# Patient Record
Sex: MALE | Race: WHITE | NOT HISPANIC OR LATINO | Employment: UNEMPLOYED | URBAN - METROPOLITAN AREA
[De-identification: names, ages, dates, MRNs, and addresses within clinical notes are randomized per-mention and may not be internally consistent; named-entity substitution may affect disease eponyms.]

---

## 2018-12-06 ENCOUNTER — HOSPITAL ENCOUNTER (EMERGENCY)
Facility: HOSPITAL | Age: 28
Discharge: HOME | End: 2018-12-06
Attending: EMERGENCY MEDICINE

## 2018-12-06 VITALS
OXYGEN SATURATION: 97 % | BODY MASS INDEX: 20.73 KG/M2 | WEIGHT: 140 LBS | SYSTOLIC BLOOD PRESSURE: 141 MMHG | HEIGHT: 69 IN | HEART RATE: 117 BPM | DIASTOLIC BLOOD PRESSURE: 86 MMHG

## 2018-12-06 DIAGNOSIS — F15.10 METHAMPHETAMINE ABUSE: ICD-10-CM

## 2018-12-06 DIAGNOSIS — T40.1X1A HEROIN OVERDOSE, ACCIDENTAL OR UNINTENTIONAL, INITIAL ENCOUNTER: Primary | ICD-10-CM

## 2018-12-06 DIAGNOSIS — F19.10 IV DRUG ABUSE: ICD-10-CM

## 2018-12-06 LAB — POCT GLUCOSE: 111 MG/DL (ref 70–110)

## 2018-12-06 PROCEDURE — 63600175 PHARM REV CODE 636 W HCPCS: Performed by: EMERGENCY MEDICINE

## 2018-12-06 PROCEDURE — 82962 GLUCOSE BLOOD TEST: CPT

## 2018-12-06 PROCEDURE — 96374 THER/PROPH/DIAG INJ IV PUSH: CPT

## 2018-12-06 PROCEDURE — 99291 CRITICAL CARE FIRST HOUR: CPT | Mod: 25

## 2018-12-06 RX ORDER — NALOXONE HYDROCHLORIDE 4 MG/.1ML
SPRAY NASAL
Qty: 1 EACH | Refills: 11 | Status: SHIPPED | OUTPATIENT
Start: 2018-12-06 | End: 2019-10-15

## 2018-12-06 RX ORDER — NALOXONE HCL 0.4 MG/ML
2 VIAL (ML) INJECTION
Status: COMPLETED | OUTPATIENT
Start: 2018-12-06 | End: 2018-12-06

## 2018-12-06 RX ADMIN — NALOXONE HYDROCHLORIDE 2 MG: 0.4 INJECTION, SOLUTION INTRAMUSCULAR; INTRAVENOUS; SUBCUTANEOUS at 07:12

## 2018-12-07 NOTE — ED NOTES
Pt immediately responded to narcan 2mg.  Awake and alert less than two minutes later.  Admitted to taking heroin.

## 2018-12-07 NOTE — ED PROVIDER NOTES
"Encounter Date: 12/6/2018    SCRIBE #1 NOTE: I, Danyell Howell, am scribing for, and in the presence of, Dr. Yuri Carlton.       History   No chief complaint on file.      Time seen by provider: 6:58 PM on 12/06/2018    Darvin Forman is a 28 y.o. male who presents to the ED with an onset of overdose on heroin that presented immediately prior to arriving to the ED. A friends dropped him off at the ED. Pt has never been in rehab or detox before. He last overdosed at 12 years old. Pt has been doing drugs for "years". The patient denies any other symptoms at this time. No pertinent SHx noted. Pt is allergic to Demerol.      The history is provided by the patient.     Review of patient's allergies indicates:   Allergen Reactions    Demerol [meperidine] Rash     Past Medical History:   Diagnosis Date    Chronic shoulder pain      No past surgical history on file.  No family history on file.  Social History     Tobacco Use    Smoking status: Current Every Day Smoker     Packs/day: 1.50     Types: Cigarettes   Substance Use Topics    Alcohol use: Yes     Comment: socially    Drug use: Yes     Types: Marijuana     Comment: ocassionally     Review of Systems   Constitutional: Negative for fever.        Positive for overdose.   HENT: Negative for sore throat.    Respiratory: Negative for shortness of breath.    Cardiovascular: Negative for chest pain.   Gastrointestinal: Negative for nausea.   Genitourinary: Negative for dysuria.   Musculoskeletal: Negative for back pain.   Skin: Negative for rash.   Neurological: Negative for weakness.   Hematological: Does not bruise/bleed easily.       Physical Exam     Initial Vitals   BP Pulse Resp Temp SpO2   -- -- -- -- --      MAP       --         Physical Exam    Nursing note and vitals reviewed.  Constitutional: He appears well-developed and well-nourished. He is diaphoretic. No distress.   Left EJ 18 gauge.   HENT:   Head: Normocephalic and atraumatic.   Eyes: Conjunctivae " and EOM are normal. Pupils are equal, round, and reactive to light.   Pinpoint pupils.    Neck: Neck supple.   Cardiovascular: Regular rhythm and normal heart sounds. Tachycardia present.  Exam reveals no gallop and no friction rub.    No murmur heard.  Pulmonary/Chest: Breath sounds normal. Apnea noted. No respiratory distress. He has no wheezes. He has no rhonchi. He has no rales.   Abdominal: Soft. Bowel sounds are normal. He exhibits no distension. There is no tenderness.   Musculoskeletal: Normal range of motion. He exhibits no edema or tenderness.   Neurological: He is alert and oriented to person, place, and time. GCS eye subscore is 1. GCS verbal subscore is 1. GCS motor subscore is 1.   Skin: Skin is warm.   Cyanotic.   Psychiatric: He has a normal mood and affect.         ED Course   Procedures  Labs Reviewed - No data to display       Imaging Results    None          Medical Decision Making:   History:   Old Medical Records: I decided to obtain old medical records.  Clinical Tests:   Lab Tests: Ordered and Reviewed  Patient received Narcan and admitted to heroin overdose.  He was trading someone methamphetamines for heroin and the lady made a premixed syringe for him with the intention that he would only use half.  However, Darvin decided to use a whole thing.  He therefore overdose.  He was brought in by private vehicle dropped off by his friends who abruptly left.  The patient was placed immediately on a gurney where I placed a 18 gauge and we gave him 1 mg of Narcan x2 with tactile firm nipple stimulation any he quickly aroused.  The patient never gave me a sample of urine, but has been are awake alert and oriented for 2 and 0.5 hr now.  I feel that he is stable for discharge at this time and I counseled him on the use of drugs and his overdose tonight encouraged him to seek outpatient therapy.  The patient does not want detox and does not want to be admitted.            Scribe Attestation:   Scribe #1:  I performed the above scribed service and the documentation accurately describes the services I performed. I attest to the accuracy of the note.    Attending Attestation:         Attending Critical Care:   Critical Care Times:   Direct Patient Care (initial evaluation, reassessments, and time considering the case)................................................................10 minutes.   Additional History from reviewing old medical records or taking additional history from the family, EMS, PCP, etc.......................5 minutes.   Ordering, Reviewing, and Interpreting Diagnostic Studies...............................................................................................................5 minutes.   Documentation..................................................................................................................................................................................5 minutes.   Consultation with other Physicians. .................................................................................................................................................5 minutes.   Consultation with the patient's family directly relating to the patient's condition, care, and DNR status (when patient unable)......10 minutes.   Other..................................................................................................................................................................................................5 minutes.   ==============================================================  · Total Critical Care Time - exclusive of procedural time: 45 minutes.  ==============================================================  Critical care was necessary to treat or prevent imminent or life-threatening deterioration of the following conditions: overdose.       I, Dr. Yuri Carlton personally performed the services described in this documentation. All medical record entries made by the  scribe were at my direction and in my presence.  I have reviewed the chart and agree that the record reflects my personal performance and is accurate and complete. Yuri Carlton MD.  9:35 PM 12/06/2018    DISCLAIMER: This note was prepared with Dragon NaturallySpeaking voice recognition transcription software. Garbled syntax, mangled pronouns, and other bizarre constructions may be attributed to that software system            Clinical Impression:   The primary encounter diagnosis was Heroin overdose, accidental or unintentional, initial encounter. Diagnoses of Methamphetamine abuse and IV drug abuse were also pertinent to this visit.      Disposition:   Disposition: Discharged  Condition: Stable                        Yuri Carlton MD  12/06/18 8448

## 2019-02-11 ENCOUNTER — HOSPITAL ENCOUNTER (EMERGENCY)
Facility: HOSPITAL | Age: 29
Discharge: LEFT BEFORE DISCHARGE | End: 2019-02-11
Attending: EMERGENCY MEDICINE
Payer: MEDICAID

## 2019-02-11 VITALS — DIASTOLIC BLOOD PRESSURE: 94 MMHG | SYSTOLIC BLOOD PRESSURE: 137 MMHG | OXYGEN SATURATION: 99 % | HEART RATE: 115 BPM

## 2019-02-11 DIAGNOSIS — T40.1X1A HEROIN OVERDOSE, ACCIDENTAL OR UNINTENTIONAL, INITIAL ENCOUNTER: Primary | ICD-10-CM

## 2019-02-11 PROCEDURE — 99282 EMERGENCY DEPT VISIT SF MDM: CPT

## 2019-02-11 NOTE — LETTER
Patient: Darvin Forman  YOB: 1990  Date: 2/11/2019 Time: 10:15 PM  Location: [facility]    Leaving the [facility] Against Medical Advice    Chart #:[id number]    This will certify that I, the undersigned,    ______________________________________________________________________    A patient in the above named medical center, having requested discharge and removal from the medical [place] against the advice of my attending physician(s), hereby release [facility], its physicians, officers and employees, severally and individually, from any and all liability of any nature whatsoever for any injury or harm or complication of any kind that may result directly or indirectly, by reason of my terminating my stay as a patient at [facility] and my departure from [facility], and hereby waive any and all rights of action I may now have or later acquire as a result of my voluntary departure from [facility] and the termination of my stay as a patient therein.    This release is made with the full knowledge of the danger that may result from the action which I am taking.      Date:_______________________                         ___________________________                                                                                    Patient/Legal Representative    Witness:        ____________________________                          ___________________________  Nurse                                                                        Physician

## 2019-02-11 NOTE — LETTER
Patient: Darvin Forman  YOB: 1990  Date: 2/11/2019 Time: 10:14 PM  Location: [facility]    Leaving the [facility] Against Medical Advice    Chart #:[id number]    This will certify that I, the undersigned,    ______________________________________________________________________    A patient in the above named medical center, having requested discharge and removal from the medical [place] against the advice of my attending physician(s), hereby release [facility], its physicians, officers and employees, severally and individually, from any and all liability of any nature whatsoever for any injury or harm or complication of any kind that may result directly or indirectly, by reason of my terminating my stay as a patient at [facility] and my departure from [facility], and hereby waive any and all rights of action I may now have or later acquire as a result of my voluntary departure from [facility] and the termination of my stay as a patient therein.    This release is made with the full knowledge of the danger that may result from the action which I am taking.      Date:_______________________                         ___________________________                                                                                    Patient/Legal Representative    Witness:        ____________________________                          ___________________________  Nurse                                                                        Physician

## 2019-02-12 NOTE — ED NOTES
Assumed care:  Patient awake, alert and oriented x 3, skin warm and dry, in NAD.    Patient identifiers for Darvin ASH Speed checked and correct.  LOC:  Patient is awake, alert, and aware of environment with an appropriate affect. Patient is oriented x 3 and speaking appropriately.  APPEARANCE:  Patient resting comfortably and in no acute distress. Patient is clean and well groomed, patient's clothing is properly fastened.  SKIN:  The skin is warm and dry. Patient has normal skin turgor and moist mucus membranes. Skin is intact; no bruising or breakdown noted.  MUSCULOSKELETAL:  Patient is moving all extremities well, no obvious deformities noted. Pulses intact.   RESPIRATORY:  Airway is open and patent. Respirations are spontaneous and non-labored with normal effort and rate.  CARDIAC:  Patient has a normal rate and rhythm. No peripheral edema noted. Capillary refill < 3 seconds.  ABDOMEN:  No distention noted.  Soft and non-tender upon palpation.  NEUROLOGICAL:  PERRL. Facial expression is symmetrical. Hand grasps are equal bilaterally. Normal sensation in all extremities when touched with finger.  Allergies reported:    Review of patient's allergies indicates:   Allergen Reactions    Demerol [meperidine] Rash     OTHER NOTES:  Patient arrived via EMS for heroin overdose.  Patient received narcan in route.  Patient is now AAO

## 2019-02-12 NOTE — ED TRIAGE NOTES
Found unresponsive at gas station in car and doing CPR in car; apneic, lying in back seat and became responsive after 2nd narcan

## 2019-02-12 NOTE — ED PROVIDER NOTES
"Encounter Date: 2/11/2019    SCRIBE #1 NOTE: I, Drea Flores , am scribing for, and in the presence of,  Dr. Garcia . I have scribed the entire note.       History     Chief Complaint   Patient presents with    Drug Overdose     on heroin; found apnic and unresponsive      02/11/2019  9:21 PM       The patient is a 28 y.o. male who is presenting per EMS s/p unintentional heroin OD that occurred just PTA. Per EMS, the pt was apneic and unresponsive at arrival to scene with cyanosis noted. Pt was given 1 mg of Narcan IV and 1 mg Narcan IV with subsequent resolution in sxs and return to consciousness. Pt denies any current physical complaints or SI. He endorses long hx of IV drug abuse and reports he simply "took too much". Pertinent PMHx includes IV DA and chronic shoulder pain. No pertinent past  surgical hx.             The history is provided by the patient, medical records and the EMS personnel.     Review of patient's allergies indicates:   Allergen Reactions    Demerol [meperidine] Rash     Past Medical History:   Diagnosis Date    Chronic shoulder pain      History reviewed. No pertinent surgical history.  History reviewed. No pertinent family history.  Social History     Tobacco Use    Smoking status: Current Every Day Smoker     Packs/day: 1.50     Types: Cigarettes    Smokeless tobacco: Never Used   Substance Use Topics    Alcohol use: Yes     Comment: socially    Drug use: Yes     Types: Marijuana     Comment: ocassionally     Review of Systems   Constitutional: Negative for fever.   HENT: Negative for sore throat.    Eyes: Negative for visual disturbance.   Respiratory: Negative for shortness of breath.    Cardiovascular: Negative for chest pain.   Gastrointestinal: Negative for nausea.   Genitourinary: Negative for dysuria.   Musculoskeletal: Negative for back pain.   Skin: Negative for rash.   Neurological: Negative for weakness.   Hematological: Does not bruise/bleed easily. "   Psychiatric/Behavioral: Negative for confusion.       Physical Exam     Initial Vitals [02/11/19 2119]   BP Pulse Resp Temp SpO2   (!) 148/103 (!) 124 -- -- 98 %      MAP       --         Physical Exam    Nursing note and vitals reviewed.  Constitutional: He appears well-developed and well-nourished. He is not diaphoretic. No distress.   HENT:   Head: Normocephalic and atraumatic.   Mouth/Throat: Oropharynx is clear and moist.   Eyes: Conjunctivae are normal. Pupils are equal, round, and reactive to light.   Pupils equal round and reactive to light with 2-4 mm in diameter.    Neck: Neck supple.   Cardiovascular: Regular rhythm, normal heart sounds and intact distal pulses. Exam reveals no gallop and no friction rub.    No murmur heard.  Tachycardic    Pulmonary/Chest: Breath sounds normal. He has no wheezes. He has no rhonchi. He has no rales.   Abdominal: Soft. He exhibits no distension. There is no tenderness.   Musculoskeletal: Normal range of motion.   Neurological: He is alert and oriented to person, place, and time. He has normal strength.   CN III through XII intact. Normal strength and sensation to all extremities.    Skin: No rash noted. No erythema.   Track mars noted to L AC.          ED Course   Procedures  Labs Reviewed - No data to display       Imaging Results    None                     Scribe Attestation:   Scribe #1: I performed the above scribed service and the documentation accurately describes the services I performed. I attest to the accuracy of the note.    I, Dr. Korey Garcia, personally performed the services described in this documentation. All medical record entries made by the scribe were at my direction and in my presence.  I have reviewed the chart and agree that the record reflects my personal performance and is accurate and complete. Korey Garcia MD.  10:31 PM 02/11/2019    Darvin Forman is a 28 y.o. male presenting with heroin overdose now asymptomatic following  Narcan.  Patient was likely hypoxic but has no obvious sequelae of brain injury. GCS 15 with nonfocal neurological examination. He is lucid and ultimately wishes to leave prior to recommended observation time in case of relapse of sedation. I do not think other diagnostic testing is indicated at this point.  I doubt suicidal intent.  No sign of other toxidrome with tachycardia likely related to withdrawal following Narcan.      Advised patient that they need further observation.  Patient refuses.  Patient is alert and oriented to person, place, and situation.  I explained the risks of worsening condition possibly leading to death in layman's terms to the patient.  Patient voices these risks back to me.   Patient is not altered and is not under the influence.  Patient is welcome to return to the hospital at any time if he chooses to do so.  I will discharge the patient AGAINST MEDICAL ADVICE.  Follow up closely with PCP and have family or friends observe closely at home.  Return precautions reviewed.        Clinical Impression:   The encounter diagnosis was Heroin overdose, accidental or unintentional, initial encounter.                             Korey Garcia MD  02/11/19 0760

## 2019-02-23 ENCOUNTER — HOSPITAL ENCOUNTER (EMERGENCY)
Facility: HOSPITAL | Age: 29
Discharge: HOME | End: 2019-02-23
Attending: EMERGENCY MEDICINE
Payer: MEDICAID

## 2019-02-23 VITALS
DIASTOLIC BLOOD PRESSURE: 87 MMHG | OXYGEN SATURATION: 95 % | WEIGHT: 155 LBS | HEART RATE: 108 BPM | HEIGHT: 69 IN | RESPIRATION RATE: 20 BRPM | TEMPERATURE: 99 F | BODY MASS INDEX: 22.96 KG/M2 | SYSTOLIC BLOOD PRESSURE: 130 MMHG

## 2019-02-23 DIAGNOSIS — F19.10 CHRONIC DRUG ABUSE: Primary | ICD-10-CM

## 2019-02-23 DIAGNOSIS — F19.10 INTRAVENOUS DRUG ABUSE: ICD-10-CM

## 2019-02-23 PROCEDURE — 99283 EMERGENCY DEPT VISIT LOW MDM: CPT

## 2019-02-23 RX ORDER — BUPRENORPHINE HYDROCHLORIDE AND NALOXONE HYDROCHLORIDE DIHYDRATE 2; .5 MG/1; MG/1
TABLET SUBLINGUAL EVERY 6 HOURS PRN
COMMUNITY
End: 2019-10-15

## 2019-02-23 RX ORDER — GABAPENTIN 300 MG/1
300 CAPSULE ORAL 3 TIMES DAILY
COMMUNITY
End: 2019-10-15

## 2019-02-23 RX ORDER — ESCITALOPRAM OXALATE 10 MG/1
10 TABLET ORAL DAILY
COMMUNITY
End: 2019-10-15

## 2019-02-23 NOTE — ED NOTES
MD in to speak to pt aware that he needs to put himself into rehab. Given written and verbal DC instructions questions answered per MD aware to follow up with PCP encouraged to return if needed.

## 2019-02-23 NOTE — ED PROVIDER NOTES
"Encounter Date: 2/23/2019    SCRIBE #1 NOTE: IEarline, am scribing for, and in the presence of, Dr. Yuri Carlton MD.       History     Chief Complaint   Patient presents with    Evaluation     under OPC for drug abuse       Time seen by provider: 2:58 PM on 02/23/2019    Darvin Forman is a 28 y.o. male with PMHx of drug use who presents to the ED for evaluation of drug use and possible SI. Patient is brought to the ED via  secondary to an OPC filed by the father. Patient adamantly denies SI. Per father, the patient has overdosed 3 times within this month and is partaking in drug use still via "shooting up Suboxone after crushing it". Father reports "wanting him to be admitted to a rehab facility". Patient currently denies SI and HI. He admits to using Suboxone and meth recently. He denies heroin use. Patient reports visiting [facility]. He denies hallucinations. Patient does not have psychiatric history. He was recently started on Lexapro. Patient has no other medical concerns or complaints at this moment.       The history is provided by the patient.     Review of patient's allergies indicates:   Allergen Reactions    Demerol [meperidine] Rash     Past Medical History:   Diagnosis Date    Chronic shoulder pain     Drug use      History reviewed. No pertinent surgical history.  History reviewed. No pertinent family history.  Social History     Tobacco Use    Smoking status: Current Every Day Smoker     Packs/day: 1.50     Types: Cigarettes    Smokeless tobacco: Never Used   Substance Use Topics    Alcohol use: Yes     Comment: socially    Drug use: Yes     Types: Marijuana     Comment: and heroin      Review of Systems   Constitutional: Negative for fever.   HENT: Negative for congestion.    Eyes: Negative for visual disturbance.   Respiratory: Negative for cough.    Cardiovascular: Negative for chest pain.   Gastrointestinal: Negative for nausea.   Musculoskeletal: Negative for back pain " and myalgias.   Skin: Negative for rash.   Neurological: Negative for headaches.   Hematological: Does not bruise/bleed easily.   Psychiatric/Behavioral: Negative for confusion, hallucinations, self-injury and suicidal ideas. The patient is not nervous/anxious.        Physical Exam     Initial Vitals [02/23/19 1441]   BP Pulse Resp Temp SpO2   130/87 108 20 98.7 °F (37.1 °C) 95 %      MAP       --         Physical Exam    Nursing note and vitals reviewed.  Constitutional: He appears well-developed and well-nourished. He is not diaphoretic. No distress.   HENT:   Head: Normocephalic and atraumatic.   Eyes: EOM are normal. Pupils are equal, round, and reactive to light.   Neck: Neck supple.   Cardiovascular: Normal rate, regular rhythm and normal heart sounds. Exam reveals no gallop and no friction rub.    No murmur heard.  Pulmonary/Chest: Breath sounds normal. No respiratory distress. He has no wheezes. He has no rhonchi. He has no rales.   Abdominal: Soft. Bowel sounds are normal. He exhibits no distension. There is no tenderness.   Musculoskeletal: Normal range of motion. He exhibits no edema or tenderness.   Neurological: He is alert and oriented to person, place, and time.   Skin: Skin is warm and dry.   Psychiatric: His speech is normal. Thought content normal. His mood appears not anxious. He is not actively hallucinating. Thought content is not paranoid and not delusional. Cognition and memory are normal. He does not exhibit a depressed mood. He expresses no homicidal and no suicidal ideation. He expresses no suicidal plans and no homicidal plans.   Negative SI and HI. Patient is calm and cooperative. He does not appear to be anxious.          ED Course   Procedures  Labs Reviewed - No data to display       Imaging Results    None          Medical Decision Making:   History:   Old Medical Records: I decided to obtain old medical records.  Patient is a longstanding intravenous drug abuser.  He is not suicidal  nor is he homicidal.  His father's concern because he has overdosed recently.  He was just kicked out of his apartment, called his dad, his dad does not want to pick him up and therefore the dad called for an OPC stating his son his drug problem.  The patient admits to using intravenous drugs but states he does not want to kill himself.  He does this recreationally.  He already follows up with as an outpatient at a  here in [place].  I see no reason to placed under physician's emergency certificate.  He appears to have a medical decision-making capacity to understand his medical illness and he does not appear to be significantly withdrawn severely depressed gravely disabled or suicidal or danger to others at this time.            Scribe Attestation:   Scribe #1: I performed the above scribed service and the documentation accurately describes the services I performed. I attest to the accuracy of the note.      I, Dr. Yuri Carlton personally performed the services described in this documentation. All medical record entries made by the scribe were at my direction and in my presence.  I have reviewed the chart and agree that the record reflects my personal performance and is accurate and complete. Yuri Carlton MD.  3:50 PM 02/23/2019    DISCLAIMER: This note was prepared with Dragon NaturallySpeaking voice recognition transcription software. Garbled syntax, mangled pronouns, and other bizarre constructions may be attributed to that software system              Clinical Impression:       ICD-10-CM ICD-9-CM   1. Chronic drug abuse F19.10 304.90   2. Intravenous drug abuse F19.10 305.90         Disposition:   Disposition: Discharged  Condition: Stable                        Yuri Carlton MD  02/23/19 0571

## 2019-02-23 NOTE — ED NOTES
Father states to MD that pt has overdosed x3 in the past month on heroin and is crushing his suboxone and snorting it father states he is concerned for son and wants him placed in drug rehab. Pt currently sleeping calm and cooperative

## 2019-10-15 ENCOUNTER — HOSPITAL ENCOUNTER (EMERGENCY)
Facility: HOSPITAL | Age: 29
Discharge: HOME | End: 2019-10-15
Attending: EMERGENCY MEDICINE
Payer: MEDICAID

## 2019-10-15 VITALS
HEART RATE: 115 BPM | RESPIRATION RATE: 16 BRPM | HEIGHT: 69 IN | BODY MASS INDEX: 21.74 KG/M2 | TEMPERATURE: 98 F | WEIGHT: 146.81 LBS | SYSTOLIC BLOOD PRESSURE: 135 MMHG | OXYGEN SATURATION: 100 % | DIASTOLIC BLOOD PRESSURE: 96 MMHG

## 2019-10-15 DIAGNOSIS — L02.416 ABSCESS OF LEFT LEG: Primary | ICD-10-CM

## 2019-10-15 PROCEDURE — 99283 EMERGENCY DEPT VISIT LOW MDM: CPT

## 2019-10-15 RX ORDER — CLINDAMYCIN HYDROCHLORIDE 150 MG/1
300 CAPSULE ORAL 4 TIMES DAILY
Qty: 40 CAPSULE | Refills: 0 | Status: SHIPPED | OUTPATIENT
Start: 2019-10-15 | End: 2019-10-20

## 2019-10-15 NOTE — ED NOTES
At D/C, Darvin Forman is AA & O x 3, his skin is warm and dry, follow up care discussed at length with patient/family to include meds and follow-up with MD; patient/family given discharge instructions along with prescriptions, as indicated, and care sheets.

## 2019-10-15 NOTE — ED PROVIDER NOTES
Encounter Date: 10/15/2019       History     Chief Complaint   Patient presents with    Abscess     HPI   Patient is a 29-year-old man who presents emergency department constant, worsening nonradiating erythema and pain to his left upper leg.  Symptoms are consistent with previous episodes of abscess/cellulitis.  He denies any fever.  He has not been on antibiotic therapy recently.  Review of patient's allergies indicates:   Allergen Reactions    Demerol [meperidine] Rash     Past Medical History:   Diagnosis Date    Chronic shoulder pain     Drug use      History reviewed. No pertinent surgical history.  History reviewed. No pertinent family history.  Social History     Tobacco Use    Smoking status: Current Every Day Smoker     Packs/day: 1.50     Types: Cigarettes    Smokeless tobacco: Never Used   Substance Use Topics    Alcohol use: Yes     Comment: socially    Drug use: Yes     Types: Marijuana     Review of Systems  REVIEW OF SYSTEMS  CONSTITUTIONAL: Negative for fever.  HEENT:  Negative for sore throat.   HEART:   Negative for chest pain..  LUNG:  Negative for shortness of breath.  ABDOMEN:  Negative for nausea.   :  No discharge, dysuria  EXTREMITIES:  No swelling  NEURO:  Negative for weakness.   SKIN:  Positive For rash  Psych: No depression  HEME: Does not bruise/bleed easily.           Physical Exam     Initial Vitals [10/15/19 0554]   BP Pulse Resp Temp SpO2   (!) 135/96 (!) 115 16 98.4 °F (36.9 °C) 100 %      MAP       --         Physical Exam  Physical Exam   Nursing note and vitals reviewed.   Constitutional: Oriented to person, place, and time. Appears well-developed and well-nourished.   HENT:   Head: Normocephalic and atraumatic.   Eyes: EOM are normal.   Neck: Normal range of motion. Neck supple.   Cardiovascular: Normal rate.   Pulmonary/Chest: Effort normal. Clear BS b/l   Abdominal: Soft, Non tender, no distension.   Musculoskeletal: Normal range of motion.   Neurological:Alert and  oriented to person, place, and time.   Skin:  There is a 2 x 2 cm area of erythema with mild induration and no fluctuance over the anterior aspect of his mid left thigh the area is mildly warm and mildly tender to palpation.  Psychiatric: Normal mood and affect. Behavior is normal.         ED Course   Procedures  Labs Reviewed - No data to display       Imaging Results    None          Medical Decision Making:   Initial Assessment:   This is an emergent evaluation of a patient with complaints of a skin infection.     I decided to obtain and review the old medical record.    Clinically the patient has an cellulitis versus early abscess.  I do not think this requires incision and drainage this time and patient would prefer outpatient antibiotic therapy.  The patient will be placed on antibiotics.  The patient has no signs of systemic symptoms or significant cellulitis to warrant admission at this time.  The patient has been instructed to follow up with their regular doctor or the emergency department in 3 days as needed if not improving.  I've given the patient specific return precautions.    Wound care has been discussed.      The results and physical exam findings were reviewed with the patient. Pt agrees with assessment, disposition and treatment plan and has no further questions or complaints at this time.    Jarred Sánchez M.D. 6:56 AM 10/15/2019                            Clinical Impression:       ICD-10-CM ICD-9-CM   1. Abscess of left leg L02.416 682.6                                Jarred Sánchez MD  10/15/19 0657

## 2019-10-15 NOTE — ED NOTES
Patient here with abscess to left upper leg; states it started yesterday and got worse; small area hot, red and tender; see picture.

## 2020-06-12 ENCOUNTER — HOSPITAL ENCOUNTER (EMERGENCY)
Facility: HOSPITAL | Age: 30
Discharge: HOME | End: 2020-06-12
Attending: EMERGENCY MEDICINE
Payer: MEDICAID

## 2020-06-12 VITALS
OXYGEN SATURATION: 97 % | SYSTOLIC BLOOD PRESSURE: 129 MMHG | TEMPERATURE: 99 F | HEART RATE: 108 BPM | DIASTOLIC BLOOD PRESSURE: 76 MMHG | BODY MASS INDEX: 22.96 KG/M2 | RESPIRATION RATE: 14 BRPM | WEIGHT: 155 LBS | HEIGHT: 69 IN

## 2020-06-12 DIAGNOSIS — R00.0 TACHYCARDIA: ICD-10-CM

## 2020-06-12 DIAGNOSIS — T40.1X1A HEROIN OVERDOSE, ACCIDENTAL OR UNINTENTIONAL, INITIAL ENCOUNTER: Primary | ICD-10-CM

## 2020-06-12 DIAGNOSIS — F19.20: ICD-10-CM

## 2020-06-12 LAB
ANION GAP SERPL CALC-SCNC: 13 MMOL/L (ref 8–16)
APAP SERPL-MCNC: <3 UG/ML (ref 10–20)
BASOPHILS # BLD AUTO: 0.03 K/UL (ref 0–0.2)
BASOPHILS NFR BLD: 0.4 % (ref 0–1.9)
BUN SERPL-MCNC: 13 MG/DL (ref 6–20)
CALCIUM SERPL-MCNC: 9.4 MG/DL (ref 8.7–10.5)
CHLORIDE SERPL-SCNC: 102 MMOL/L (ref 95–110)
CO2 SERPL-SCNC: 24 MMOL/L (ref 23–29)
CREAT SERPL-MCNC: 1.4 MG/DL (ref 0.5–1.4)
DIFFERENTIAL METHOD: NORMAL
EOSINOPHIL # BLD AUTO: 0 K/UL (ref 0–0.5)
EOSINOPHIL NFR BLD: 0.5 % (ref 0–8)
ERYTHROCYTE [DISTWIDTH] IN BLOOD BY AUTOMATED COUNT: 12.9 % (ref 11.5–14.5)
EST. GFR  (AFRICAN AMERICAN): >60 ML/MIN/1.73 M^2
EST. GFR  (NON AFRICAN AMERICAN): >60 ML/MIN/1.73 M^2
GLUCOSE SERPL-MCNC: 161 MG/DL (ref 70–110)
HCT VFR BLD AUTO: 43.5 % (ref 40–54)
HGB BLD-MCNC: 14.1 G/DL (ref 14–18)
IMM GRANULOCYTES # BLD AUTO: 0.02 K/UL (ref 0–0.04)
IMM GRANULOCYTES NFR BLD AUTO: 0.2 % (ref 0–0.5)
LYMPHOCYTES # BLD AUTO: 1.6 K/UL (ref 1–4.8)
LYMPHOCYTES NFR BLD: 20.4 % (ref 18–48)
MCH RBC QN AUTO: 29.1 PG (ref 27–31)
MCHC RBC AUTO-ENTMCNC: 32.4 G/DL (ref 32–36)
MCV RBC AUTO: 90 FL (ref 82–98)
MONOCYTES # BLD AUTO: 0.7 K/UL (ref 0.3–1)
MONOCYTES NFR BLD: 8.2 % (ref 4–15)
NEUTROPHILS # BLD AUTO: 5.6 K/UL (ref 1.8–7.7)
NEUTROPHILS NFR BLD: 70.3 % (ref 38–73)
NRBC BLD-RTO: 0 /100 WBC
PLATELET # BLD AUTO: 221 K/UL (ref 150–350)
PMV BLD AUTO: 11.2 FL (ref 9.2–12.9)
POTASSIUM SERPL-SCNC: 4.2 MMOL/L (ref 3.5–5.1)
RBC # BLD AUTO: 4.84 M/UL (ref 4.6–6.2)
SALICYLATES SERPL-MCNC: <5 MG/DL (ref 15–30)
SODIUM SERPL-SCNC: 139 MMOL/L (ref 136–145)
WBC # BLD AUTO: 8.03 K/UL (ref 3.9–12.7)

## 2020-06-12 PROCEDURE — 85025 COMPLETE CBC W/AUTO DIFF WBC: CPT

## 2020-06-12 PROCEDURE — 99284 EMERGENCY DEPT VISIT MOD MDM: CPT | Mod: 25

## 2020-06-12 PROCEDURE — 80329 ANALGESICS NON-OPIOID 1 OR 2: CPT

## 2020-06-12 PROCEDURE — 36415 COLL VENOUS BLD VENIPUNCTURE: CPT

## 2020-06-12 PROCEDURE — 25000003 PHARM REV CODE 250: Performed by: EMERGENCY MEDICINE

## 2020-06-12 PROCEDURE — 80307 DRUG TEST PRSMV CHEM ANLYZR: CPT

## 2020-06-12 PROCEDURE — 93005 ELECTROCARDIOGRAM TRACING: CPT

## 2020-06-12 PROCEDURE — 80048 BASIC METABOLIC PNL TOTAL CA: CPT

## 2020-06-12 RX ORDER — NALOXONE HYDROCHLORIDE 4 MG/.1ML
SPRAY NASAL
Qty: 1 EACH | Refills: 0 | Status: SHIPPED | OUTPATIENT
Start: 2020-06-12

## 2020-06-12 RX ADMIN — SODIUM CHLORIDE 1000 ML: 0.9 INJECTION, SOLUTION INTRAVENOUS at 10:06

## 2020-06-12 NOTE — DISCHARGE INSTRUCTIONS
If you do not stop abusing drugs, you may die from overdose or suffer other irreversible worsening of your health.    [facility]    [facility] [phone]    - Dr. Molina  - Dr. Barreto  - Dr. Almendarez  - Dr. Richardson  - Dr. Sierra  - Dr. Soto  - Dr. Cameron  - Dr. Love ([place])    [facility] [phone]    - Dr. Reddy  - Dr. Fonseca  - Dr. Guzman  - Dr. Rios    [facility]    - Dr. Triplett [phone]  - Dr. Ojeda [phone]  - Dr. Workman [phone]    Dr. Schreiber [phone]  Dr. Felix [phone]  Dr. Christine [phone]  Dr. Triplett [phone]  Dr. Hector [phone]  Dr. Holley [phone]  Dr. Anaya [phone]  Dr. Galicia [phone]  Dr. Cullen [phone]    [facility]    [facility] [phone]  Dr. Aguilar [phone]  Dr. Lucio [phone]  Dr. Jaeger [phone]  Dr. Colvin [phone]  Dr. Burleson [phone]  Dr. Almazan [phone]          [facility]  - [address] [place] [address]  - [phone]    [facility]  - [address] [place] [address]  - [phone]    [facility]  - [address], [address], [place]  - [phone]    [facility]  - [address] [place] [address]  - [phone]    [facility]  - [address], [facility] [address]  - [phone]    [facility]  - [address] [place] [address], [place] [address]  - [phone]    [facility]  - [address] [place] [address]  - [phone]

## 2020-06-12 NOTE — ED PROVIDER NOTES
"Encounter Date: 6/12/2020    SCRIBE #1 NOTE: I, Estefany Bailey, am scribing for, and in the presence of, Koery Garcia MD.       History     Chief Complaint   Patient presents with    Drug Overdose     found unresponsive, "woke up" upon EMS moving onto stretcher.  Reports Heroin injection this am and crystal meth last night.  Current pinpoint pupils, GCS 15        Time seen by provider: 10:12 AM on 06/12/2020    Darvin Forman is a 30 y.o. male with a PMHx of drug use who presents to the ED via EMS with an onset of drug overdose. Per EMS, the patient was found unresponsive, but became responsive once they lifted him onto the stretcher. The patient did not require narcan. The patient admits to heroin use 1 hour prior to EMS arrival, and meth use last night. The patient denies alcohol use. The patient denies trying to hurt himself, pain or any other symptoms at this time. No PSHx.      The history is provided by the patient and the EMS personnel.     Review of patient's allergies indicates:   Allergen Reactions    Demerol [meperidine] Rash     Past Medical History:   Diagnosis Date    Chronic shoulder pain     Drug use      No past surgical history on file.  No family history on file.  Social History     Tobacco Use    Smoking status: Current Every Day Smoker     Packs/day: 1.50     Types: Cigarettes    Smokeless tobacco: Never Used   Substance Use Topics    Alcohol use: Yes     Comment: socially    Drug use: Yes     Types: Marijuana     Review of Systems   Constitutional: Negative for fever.        + Drug overdose.   HENT: Negative for sore throat.    Respiratory: Negative for shortness of breath.    Cardiovascular: Negative for chest pain.   Gastrointestinal: Negative for nausea.   Genitourinary: Negative for dysuria.   Musculoskeletal: Negative for back pain.   Skin: Negative for rash.   Neurological: Negative for weakness.   Hematological: Does not bruise/bleed easily.   Psychiatric/Behavioral: " Negative for suicidal ideas.       Physical Exam     Initial Vitals [06/12/20 1001]   BP Pulse Resp Temp SpO2   (!) 125/59 (!) 128 14 98.7 °F (37.1 °C) (!) 94 %      MAP       --         Physical Exam    Nursing note and vitals reviewed.  Constitutional: He appears well-developed and well-nourished. He is not diaphoretic. No distress.   HENT:   Head: Normocephalic and atraumatic.   Mouth/Throat: Oropharynx is clear and moist.   Eyes: Conjunctivae are normal. Pupils are equal, round, and reactive to light.   PERRL 3/2.   Neck: Neck supple.   Cardiovascular: Regular rhythm, normal heart sounds and intact distal pulses. Tachycardia present.  Exam reveals no gallop and no friction rub.    No murmur heard.  Pulmonary/Chest: Breath sounds normal. He has no wheezes. He has no rhonchi. He has no rales.   Abdominal: Soft. He exhibits no distension. There is no tenderness.   Musculoskeletal: Normal range of motion.   Neurological: He is alert and oriented to person, place, and time.   Cranial nerves II through XII grossly intact. 5/5 strength with intact sensation to BUE's and BLE's.     Skin: No rash noted. No erythema.   Track marks on upper extremities.    Psychiatric: He expresses no suicidal ideation.   No SI.         ED Course   Procedures  Labs Reviewed   BASIC METABOLIC PANEL - Abnormal; Notable for the following components:       Result Value    Glucose 161 (*)     All other components within normal limits   ACETAMINOPHEN LEVEL - Abnormal; Notable for the following components:    Acetaminophen (Tylenol), Serum <3.0 (*)     All other components within normal limits   SALICYLATE LEVEL - Abnormal; Notable for the following components:    Salicylate Lvl <5.0 (*)     All other components within normal limits   CBC W/ AUTO DIFFERENTIAL        ECG Results          EKG 12-lead (In process)  Result time 06/12/20 10:56:39    In process by Interface, Lab In [facility] (06/12/20 10:56:39)                 Narrative:    Test Reason :  R00.0,    Vent. Rate : 121 BPM     Atrial Rate : 121 BPM     P-R Int : 122 ms          QRS Dur : 090 ms      QT Int : 314 ms       P-R-T Axes : 075 073 066 degrees     QTc Int : 445 ms    Sinus tachycardia  Possible Left atrial enlargement  Nonspecific ST abnormality  Abnormal ECG  No previous ECGs available    Referred By: [email or web address]   SELF           Confirmed By:                             Imaging Results    None          Medical Decision Making:   History:   Old Medical Records: I decided to obtain old medical records.  Independently Interpreted Test(s):   I have ordered and independently interpreted EKG Reading(s) - see prior notes  Clinical Tests:   Lab Tests: Ordered and Reviewed  Medical Tests: Ordered and Reviewed            Scribe Attestation:   Scribe #1: I performed the above scribed service and the documentation accurately describes the services I performed. I attest to the accuracy of the note.    I, Dr. Korey Garcia, personally performed the services described in this documentation. All medical record entries made by the scribe were at my direction and in my presence.  I have reviewed the chart and agree that the record reflects my personal performance and is accurate and complete. Korey Garcia MD.  3:39 PM 06/12/2020    Darvin Forman is a 30 y.o. male presenting with polysubstance overdose with heroin and methamphetamine.  Patient is initially tachycardic not consistent with the heroin he admits to using this morning.  Tachycardia significantly improved with observation and IV fluids over a 2 hr period of observation.  I doubt sympathomimetic toxicity requiring further antidote or treatment or prolonged observation.  I doubt other ingestion requiring specific treatment.  Naloxone as necessary prescribed given potential for future overdose.  There is no sign of other end-organ dysfunction.  I doubt ACS.  I do not think cardiac biomarker is indicated.  No other causes of tachycardia such as  sepsis or PE.  Outpatient rehab and PCP follow-up also strongly recommended.  Detailed return precautions reviewed.        ED Course as of Jun 12 1206   Fri Jun 12, 2020   1028 EKG:  Sinus tachycardia, rate of 121, normal intervals and axis.  No significant ST elevation or depression or pathologic T-wave inversion.  No sign of acute ischemia or infarction.      [MR]      ED Course User Index  [MR] Korey Garcia MD                Clinical Impression:       ICD-10-CM ICD-9-CM   1. Heroin overdose, accidental or unintentional, initial encounter T40.1X1A 965.01     E850.0   2. Tachycardia R00.0 785.0   3. Polysubstance dependence including opioid type drug without complication, episodic abuse F19.20 304.72         Disposition:   Disposition: Discharged  Condition: Stable     ED Disposition Condition    Discharge Stable        ED Prescriptions     Medication Sig Dispense Start Date End Date Auth. Provider    naloxone (NARCAN) 4 mg/actuation Spry 4mg by nasal route as needed for opioid overdose; may repeat every 2-3 minutes in alternating nostrils until medical help arrives. Call 911 1 each 6/12/2020  Korey Garcia MD        Follow-up Information     Follow up With Specialties Details Why Contact [facility]   or PCP of your choice, 1 week [address] [place] [address]  [phone]      Outpatient rehab, next week                                         Korey Garcia MD  06/12/20 4909

## 2022-03-08 ENCOUNTER — PATIENT OUTREACH (OUTPATIENT)
Dept: EMERGENCY MEDICINE | Facility: HOSPITAL | Age: 32
End: 2022-03-08
Payer: MEDICAID

## 2022-03-08 ENCOUNTER — HOSPITAL ENCOUNTER (EMERGENCY)
Facility: HOSPITAL | Age: 32
Discharge: HOME | End: 2022-03-08
Attending: EMERGENCY MEDICINE
Payer: MEDICAID

## 2022-03-08 VITALS
TEMPERATURE: 97 F | BODY MASS INDEX: 22.22 KG/M2 | RESPIRATION RATE: 20 BRPM | DIASTOLIC BLOOD PRESSURE: 77 MMHG | WEIGHT: 150 LBS | OXYGEN SATURATION: 98 % | HEART RATE: 101 BPM | HEIGHT: 69 IN | SYSTOLIC BLOOD PRESSURE: 132 MMHG

## 2022-03-08 DIAGNOSIS — L03.114 CELLULITIS OF LEFT ELBOW: Primary | ICD-10-CM

## 2022-03-08 PROCEDURE — 99284 EMERGENCY DEPT VISIT MOD MDM: CPT

## 2022-03-08 PROCEDURE — 25000003 PHARM REV CODE 250: Performed by: EMERGENCY MEDICINE

## 2022-03-08 RX ORDER — IBUPROFEN 600 MG/1
600 TABLET ORAL EVERY 6 HOURS PRN
Qty: 20 TABLET | Refills: 0 | Status: SHIPPED | OUTPATIENT
Start: 2022-03-08

## 2022-03-08 RX ORDER — SULFAMETHOXAZOLE AND TRIMETHOPRIM 800; 160 MG/1; MG/1
1 TABLET ORAL
Status: COMPLETED | OUTPATIENT
Start: 2022-03-08 | End: 2022-03-08

## 2022-03-08 RX ORDER — SULFAMETHOXAZOLE AND TRIMETHOPRIM 800; 160 MG/1; MG/1
1 TABLET ORAL 2 TIMES DAILY
Qty: 14 TABLET | Refills: 0 | Status: SHIPPED | OUTPATIENT
Start: 2022-03-08 | End: 2022-03-15

## 2022-03-08 RX ORDER — IBUPROFEN 600 MG/1
600 TABLET ORAL
Status: COMPLETED | OUTPATIENT
Start: 2022-03-08 | End: 2022-03-08

## 2022-03-08 RX ADMIN — SULFAMETHOXAZOLE AND TRIMETHOPRIM 1 TABLET: 800; 160 TABLET ORAL at 12:03

## 2022-03-08 RX ADMIN — IBUPROFEN 600 MG: 600 TABLET ORAL at 12:03

## 2022-03-08 NOTE — ED NOTES
Assumed care:  Darvin Forman is awake, alert and oriented x 3, skin warm and dry, in NAD.  CO abscess to left elbow.  Left elbow red and swollen.    Patient identifiers for aDrvin Forman checked and correct.  LOC:  Darvin Forman is awake, alert, and aware of environment with an appropriate affect. He is oriented x 3 and speaking appropriately.  APPEARANCE:  He is resting comfortably and in no acute distress. He is clean and well groomed, patient's clothing is properly fastened.  SKIN:  The skin is warm and dry. He has normal skin turgor and moist mucus membranes. cellulitis to left elbow  MUSCULOSKELETAL:  He is moving all extremities well, no obvious deformities noted. Pulses intact.   RESPIRATORY:  Airway is open and patent. Respirations are spontaneous and non-labored with normal effort and rate.  CARDIAC:  He has a normal rate and rhythm. No peripheral edema noted. Capillary refill < 3 seconds.  ABDOMEN:  No distention noted.  Soft and non-tender upon palpation.  NEUROLOGICAL:  PERRL. Facial expression is symmetrical. Hand grasps are equal bilaterally. Normal sensation in all extremities when touched with finger.  Allergies reported:    Review of patient's allergies indicates:   Allergen Reactions    Demerol [meperidine] Rash     OTHER NOTES:

## 2022-03-08 NOTE — PROGRESS NOTES
Latonia Caal  ED Navigator  Emergency Department    Project: [facility] ED Navigator  Role: Community Health Worker    Date: 03/08/2022  Patient Name: Darvin Forman  MRN: [id number]  PCP: Primary Doctor No    Assessment:     Darvin Forman is a 31 y.o. male who has presented to ED for cellulitis. Patient has visited the ED 1 times in the past 3 months. Patient did not contact PCP.     ED Navigator Initial Assessment    ED Navigator Enrollment Documentation  Consent to Services  Does patient consent to completing the assessment?: Yes  Contact  Method of Initial Contact: Face to Face  Transportation  Does the patient have issues with Transportation?: No  Does the patient have transportation to and from healthcare appointments?: Yes  Insurance Coverage  Do you have coverage/adequate coverage?: Yes  Type/kind of coverage: Medicaid/[employer]  Is patient able to afford co-pays/deductibles?: Yes  Is patient able to afford HME or supplies?: Yes  Does patient have an established [facility] PCP?: No  Does patient need assistance finding a PCP?: Yes  Does the patient have a lack of adequate coverage?: No  Specialist Appointment  Did the patient come to the ED to see a specialist?: No  Does the patient have a pending specialist referral?: No  Does the patient have a specialist appointment made?: No  PCP Follow Up Appointment  Has the patient had an appointment with a primary care provider in the past year?: No  Does the patient have a follow up appontment with a PCP?: No  Why does the patient not have a follow up scheduled?: No established [facility]/outside PCP  Would you like an [facility] PCP?: Yes  Medications  Is patient able to afford medication?: Yes  Is patient unable to get medication due to lack of transportation?: No  Psychological  Does the patient have psycho-social concerns?: No  Food  Does the patient have concerns about food?: No  Communication/Education  Does the patient have limited English proficiency/English not primary  language?: No  Does patient have low literacy and/or low health literacy?: Yes  Does patient have concerns with care?: No  Does patient have dissatisfaction with care?: No  Other Financial Concerns  Does the patient have immediate financial distress?: No  Does the patient have general financial concerns?: No  Other Social Barriers/Concerns  Does the patient have any additional barriers or concerns?: Work  Primary Barrier  Barriers identified: Cognitive barrier (health literacy, language and communication, etc.)  Root Cause of ED Utilization: Patient Knowledge/Low Health Literacy  Plan to address Patient Knowledge/Low Health Literacy: Provided information for [facility] 24/7 Nurse triage line [phone] or [facility] ([phone])  Plan: Provided information for [facility]/7 Nurse triage line, [phone] or [facility] ([phone])  Expected Date of Follow Up 1: 3/22/22         Social History     Socioeconomic History    Marital status: Single   Tobacco Use    Smoking status: Current Every Day Smoker     Packs/day: 1.50     Types: Cigarettes    Smokeless tobacco: Never Used   Substance and Sexual Activity    Alcohol use: Yes     Comment: socially    Drug use: Yes     Types: Marijuana    Sexual activity: Yes     Partners: Female     Social Determinants of Health     Financial Resource Strain: Low Risk     Difficulty of Paying Living Expenses: Not hard at all   Food Insecurity: No Food Insecurity    Worried About Running Out of Food in the Last Year: Never true    Ran Out of Food in the Last Year: Never true   Transportation Needs: No Transportation Needs    Lack of Transportation (Medical): No    Lack of Transportation (Non-Medical): No   Stress: No Stress Concern Present    Feeling of Stress : Not at all   Social Connections: Unknown    Frequency of Communication with Friends and Family: Three times a week    Frequency of Social Gatherings with Friends and Family: Three times  a week    Marital Status: Never    Housing Stability: Unknown    Unable to Pay for Housing in the Last Year: No    Unstable Housing in the Last Year: No       Plan:   ED Navigator met with patient in the ED and completed initial enrollment.  Patient denied any concerns with food, transportation, housing, or utilities.  Patient stated he does not have a PCP and accepted ED Navigator's offer to schedule an appointment to establish one (later in the afternoon.)  Patient denied needing appointments with other providers.  Patient reported not additional needs or concerns.  The following resources were emailed to him:  [facility] brochure, information on virtual medical visits, and the [facility] nurse triage line.    Latonia Caal  ED Navigator

## 2022-03-08 NOTE — ED PROVIDER NOTES
Encounter Date: 3/8/2022    SCRIBE #1 NOTE: I, Alonajoanna White, am scribing for, and in the presence of, Matthew Valencia III, MD.       History     Chief Complaint   Patient presents with    Abscess     Lt elbow swelling x 2 days (atraumatic) - states Hx of abscesses         Time seen by provider: 12:28 PM on 03/08/2022    Darvin Forman is a 31 y.o. male who presents to the ED with an onset of an abscess to the left posterior elbow for 2 days.  Patient reports a Hx of abscesses and notes no aggravating or alleviating factors.  He confirms swelling to the area.  The patient denies any other symptoms at this time.  PMHx of shoulder pain and drug use.  No PSHx documented.  No allergies mentioned.      The history is provided by the patient.     Review of patient's allergies indicates:   Allergen Reactions    Demerol [meperidine] Rash     Past Medical History:   Diagnosis Date    Chronic shoulder pain     Drug use      No past surgical history on file.  No family history on file.  Social History     Tobacco Use    Smoking status: Current Every Day Smoker     Packs/day: 1.50     Types: Cigarettes    Smokeless tobacco: Never Used   Substance Use Topics    Alcohol use: Yes     Comment: socially    Drug use: Yes     Types: Marijuana     Review of Systems   Constitutional: Negative for activity change, appetite change, chills, fatigue and fever.   Eyes: Negative for visual disturbance.   Respiratory: Negative for apnea and shortness of breath.    Cardiovascular: Negative for chest pain and palpitations.   Gastrointestinal: Negative for abdominal distention and abdominal pain.   Genitourinary: Negative for difficulty urinating.   Musculoskeletal: Positive for joint swelling. Negative for neck pain.   Skin: Positive for wound (abscess). Negative for pallor and rash.   Neurological: Negative for headaches.   Hematological: Does not bruise/bleed easily.   Psychiatric/Behavioral: Negative for agitation.       Physical Exam      Initial Vitals [03/08/22 1152]   BP Pulse Resp Temp SpO2   (!) 144/86 100 18 97.4 °F (36.3 °C) 100 %      MAP       --         Physical Exam    Nursing note and vitals reviewed.  Constitutional: He appears well-developed and well-nourished.   HENT:   Head: Normocephalic and atraumatic.   Eyes: Conjunctivae are normal.   Neck: Neck supple.   Normal range of motion.  Cardiovascular: Normal rate, regular rhythm and normal heart sounds. Exam reveals no gallop and no friction rub.    No murmur heard.  Pulmonary/Chest: Breath sounds normal. No respiratory distress. He has no wheezes. He has no rhonchi. He has no rales.   Abdominal: Abdomen is soft. He exhibits no distension. There is no abdominal tenderness.   Musculoskeletal:         General: Normal range of motion.      Left elbow: Swelling present. No tenderness.      Cervical back: Normal range of motion and neck supple.      Comments: Erythema, swelling and calor over the left extensor surface of the olecranon.  No focal tenderness or evidence of bursitis.       Neurological: He is alert and oriented to person, place, and time.   Skin: Skin is warm and dry.   Psychiatric: He has a normal mood and affect.             ED Course   Procedures  Labs Reviewed - No data to display       Imaging Results    None          Medications   sulfamethoxazole-trimethoprim 800-160mg per tablet 1 tablet (1 tablet Oral Given 3/8/22 1249)   ibuprofen tablet 600 mg (600 mg Oral Given 3/8/22 1248)     Medical Decision Making:   History:   Old Medical Records: I decided to obtain old medical records.  ED Management:  31-year-old male presents with erythema pain and tenderness over the olecranon.  The symptoms are somewhat widespread and not localized over the olecranon bursa.  Bursitis remains a consideration; however, the symptoms are more likely to reflect cellulitis.  He is placed on Bactrim and referred to Orthopedic surgery if symptoms are to persist beyond 2 days.  He is to return  for worsening.       APC / Resident Notes:   I, Dr. Matthew Valencia III, personally performed the services described in this documentation. All medical record entries made by the scribe were at my direction and in my presence.  I have reviewed the chart and agree that the record reflects my personal performance and is accurate and complete     Scribe Attestation:   Scribe #1: I performed the above scribed service and the documentation accurately describes the services I performed. I attest to the accuracy of the note.                   Clinical Impression:   Final diagnoses:  [L03.114] Cellulitis of left elbow (Primary)          ED Disposition Condition    Discharge Stable        ED Prescriptions     Medication Sig Dispense Start Date End Date Auth. Provider    sulfamethoxazole-trimethoprim 800-160mg (BACTRIM DS) 800-160 mg Tab Take 1 tablet by mouth 2 (two) times daily. for 7 days 14 tablet 3/8/2022 3/15/2022 Matthew Valencia III, MD    ibuprofen (ADVIL,MOTRIN) 600 MG tablet Take 1 tablet (600 mg total) by mouth every 6 (six) hours as needed for Pain. 20 tablet 3/8/2022  Matthew Valencia III, MD        Follow-up Information     Follow up With Specialties Details Why Contact Info    Brannon Bartlett MD Orthopedic Surgery, Surgery In 2 days  [address] [place] [address] [address] [id number]  [phone]             Matthew Valencia III, MD  03/08/22 7635

## 2022-03-22 ENCOUNTER — PATIENT OUTREACH (OUTPATIENT)
Dept: EMERGENCY MEDICINE | Facility: HOSPITAL | Age: 32
End: 2022-03-22

## 2022-03-25 ENCOUNTER — HOSPITAL ENCOUNTER (EMERGENCY)
Facility: HOSPITAL | Age: 32
Discharge: HOME | End: 2022-03-25
Attending: EMERGENCY MEDICINE
Payer: MEDICAID

## 2022-03-25 VITALS
HEIGHT: 69 IN | OXYGEN SATURATION: 99 % | HEART RATE: 95 BPM | SYSTOLIC BLOOD PRESSURE: 133 MMHG | TEMPERATURE: 99 F | BODY MASS INDEX: 22.15 KG/M2 | DIASTOLIC BLOOD PRESSURE: 82 MMHG | RESPIRATION RATE: 16 BRPM

## 2022-03-25 DIAGNOSIS — S83.92XA LEFT KNEE SPRAIN: Primary | ICD-10-CM

## 2022-03-25 PROCEDURE — 99283 EMERGENCY DEPT VISIT LOW MDM: CPT | Mod: 25

## 2022-03-25 RX ORDER — NAPROXEN 500 MG/1
500 TABLET ORAL 2 TIMES DAILY WITH MEALS
Qty: 60 TABLET | Refills: 0 | Status: SHIPPED | OUTPATIENT
Start: 2022-03-25

## 2022-03-26 NOTE — ED PROVIDER NOTES
"Encounter Date: 3/25/2022    SCRIBE #1 NOTE: I, Melinda Barton, am scribing for, and in the presence of, Jarred Sánchez MD.       History     Chief Complaint   Patient presents with    Knee Injury     L knee popped yesterday and now having constant medial pain in the area     Time seen by provider: 9:09 PM on 03/25/2022    Darvin Forman is a 31 y.o. male who presents to the ED with left knee pain and swelling that began yesterday. Pt was holding a wooden board and when he twisted he felt a "pop". Pt reports swelling in the knee today. 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 Pt reports occasional "clicking" lynne walking. The patient denies fever, congestion, cough, chest pain, abdominal pain, or any other symptoms at this time. PMHx of drug use. No PSHx.     The history is provided by the patient.     Review of patient's allergies indicates:   Allergen Reactions    Demerol [meperidine] Rash     Past Medical History:   Diagnosis Date    Chronic shoulder pain     Drug use      No past surgical history on file.  No family history on file.  Social History     Tobacco Use    Smoking status: Current Every Day Smoker     Packs/day: 1.50     Types: Cigarettes    Smokeless tobacco: Never Used   Substance Use Topics    Alcohol use: Yes     Comment: socially    Drug use: Yes     Types: Marijuana     Review of Systems   Constitutional: Negative for fever.   HENT: Negative for congestion.    Eyes: Negative for visual disturbance.   Respiratory: Negative for cough.    Cardiovascular: Negative for chest pain.   Gastrointestinal: Negative for abdominal pain.   Musculoskeletal: Positive for arthralgias and joint swelling.   Skin: Negative for pallor.   Hematological: Does not bruise/bleed easily.   Psychiatric/Behavioral: Negative for agitation.       Physical Exam     Initial Vitals [03/25/22 1938]   BP Pulse Resp Temp SpO2   133/82 95 16 98.6 °F (37 °C) 99 %      MAP       --         Physical Exam    Nursing note and " vitals reviewed.  Constitutional: He appears well-developed and well-nourished.  Non-toxic appearance. No distress.   HENT:   Head: Normocephalic and atraumatic.   Eyes: EOM are normal. Pupils are equal, round, and reactive to light.   Neck: Neck supple. No JVD present.   Normal range of motion.  Cardiovascular: Normal rate, regular rhythm, normal heart sounds and intact distal pulses. Exam reveals no gallop and no friction rub.    No murmur heard.  Pulmonary/Chest: Breath sounds normal. He has no wheezes. He has no rhonchi. He has no rales.   Abdominal: Abdomen is soft. Bowel sounds are normal. There is no abdominal tenderness. There is no rebound and no guarding.   Musculoskeletal:         General: Normal range of motion.      Cervical back: Normal range of motion and neck supple. No rigidity.      Left knee: Swelling present. Normal range of motion. Tenderness present over the medial joint line.      Comments: Negative Tyrone test and Lachman test. Mild swelling of left knee with warmth and tenderness to medial joint line.      Neurological: He is alert and oriented to person, place, and time. He has normal strength and normal reflexes. No cranial nerve deficit or sensory deficit. He exhibits normal muscle tone. Coordination normal.   Skin: Skin is warm and dry.   Psychiatric: He has a normal mood and affect. His speech is normal and behavior is normal. He is not actively hallucinating.         ED Course   Procedures  Labs Reviewed - No data to display       Imaging Results          X-Ray Knee 3 View Left (Final result)  Result time 03/25/22 21:56:52    Final result by Luis Barreto DO (03/25/22 21:56:52)                 Impression:      No acute osseous abnormality of the left knee.      Electronically signed by: Luis Barreto  Date:    03/25/2022  Time:    21:56             Narrative:    EXAMINATION:  XR KNEE 3 VIEW LEFT    CLINICAL HISTORY:  Sprain of unspecified site of left knee, initial  encounter    TECHNIQUE:  AP, cross-table lateral, and Merchant views of the left knee were performed.    COMPARISON:  None    FINDINGS:  There is no acute fracture or dislocation. Alignment is normal. Joint spaces are preserved. No joint effusion.                                 Medications - No data to display  Medical Decision Making:   History:   Old Medical Records: I decided to obtain old medical records.  Initial Assessment:   Patient is a 31-year-old man who presents to the emergency department status post twisting his knee and experiencing pain over the medial aspect of his left knee.  On examination he has warmth and mild swelling of the knee.  There is no laxity noted.  Differential diagnosis includes knee sprain versus meniscus tear.  I have low suspicion for ACL/PCL injury or any significant laxity or instability.  Will treat with ice and naproxen and if not improved in 2 weeks patient to follow-up with orthopedic surgery.  Return precautions discussed.  Discharged in no acute distress.  Clinical Tests:   Radiological Study: Ordered and Reviewed          Scribe Attestation:   Scribe #1: I performed the above scribed service and the documentation accurately describes the services I performed. I attest to the accuracy of the note.              I, Gonzalo Montalvo, personally performed the services described in this documentation. All medical record entries made by the scribe were at my direction and in my presence.  I have reviewed the chart and agree that the record reflects my personal performance and is accurate and complete. Jarred Sánchez MD.    Clinical Impression:   Final diagnoses:  [S83.92XA] Left knee sprain (Primary)          ED Disposition Condition    Discharge Stable      I, Gonzalo Montalvo, personally performed the services described in this documentation. All medical record entries made by the scribe were at my direction and in my presence.  I have reviewed the chart and agree that  the record reflects my personal performance and is accurate and complete. Jarred Sánchez MD.  ED Prescriptions     Medication Sig Dispense Start Date End Date Auth. Provider    naproxen (NAPROSYN) 500 MG tablet Take 1 tablet (500 mg total) by mouth 2 (two) times daily with meals. 60 tablet 3/25/2022  Jarred Sánchez MD        Follow-up Information     Follow up With Specialties Details Why Contact Info    [facility] Emergency Dept Emergency Medicine  As needed, If symptoms worsen [address] [place] [id number] [phone]    Ivan Ritter MD Orthopedic Surgery Schedule an appointment as soon as possible for a visit in 2 weeks  [address] [place] [address]  [address]  [facility] ORTHOPEDICS & SPORTS MEDICINE  [facility] [address]  [phone]             Jarred Sánchez MD  03/26/22 0009

## 2022-03-26 NOTE — ED NOTES
Left knee pain since yesterday, felt a popped while doing a twisting motion at work, denies direct trauma or injury, no obvious swelling or deformity.

## 2023-06-01 ENCOUNTER — HOSPITAL ENCOUNTER (EMERGENCY)
Facility: HOSPITAL | Age: 33
Discharge: HOME | End: 2023-06-01
Attending: EMERGENCY MEDICINE
Payer: MEDICAID

## 2023-06-01 VITALS
RESPIRATION RATE: 20 BRPM | TEMPERATURE: 98 F | OXYGEN SATURATION: 100 % | DIASTOLIC BLOOD PRESSURE: 73 MMHG | WEIGHT: 150 LBS | BODY MASS INDEX: 22.22 KG/M2 | HEIGHT: 69 IN | SYSTOLIC BLOOD PRESSURE: 122 MMHG | HEART RATE: 93 BPM

## 2023-06-01 DIAGNOSIS — K08.89 PAIN, DENTAL: Primary | ICD-10-CM

## 2023-06-01 PROCEDURE — 99283 EMERGENCY DEPT VISIT LOW MDM: CPT

## 2023-06-01 RX ORDER — PENICILLIN V POTASSIUM 500 MG/1
500 TABLET, FILM COATED ORAL 4 TIMES DAILY
Qty: 28 TABLET | Refills: 0 | Status: SHIPPED | OUTPATIENT
Start: 2023-06-01 | End: 2023-06-08

## 2023-06-01 NOTE — ED PROVIDER NOTES
Encounter Date: 6/1/2023    SCRIBE #1 NOTE: I, Dee Garcia, am scribing for, and in the presence of,  MINDI Aviles.     History     Chief Complaint   Patient presents with    Dental Pain     Dental abscess upper left side      Time seen by provider: 1:30 PM on 06/01/2023    Darvin Forman is a 33 y.o. male who presents to the ED with an onset of dental pain to the upper left side that began 1 day ago. Patient reports Hx of dental abscess to other teeth than the one in concern today. The patient denies fever or any other symptoms at this time. No known allergies noted. PMHx of dental abscess. No pertinent PSHx.       The history is provided by the patient.   Review of patient's allergies indicates:   Allergen Reactions    Demerol [meperidine] Rash     Past Medical History:   Diagnosis Date    Chronic shoulder pain     Drug use      No past surgical history on file.  No family history on file.  Social History     Tobacco Use    Smoking status: Every Day     Packs/day: 1.50     Types: Cigarettes    Smokeless tobacco: Never   Substance Use Topics    Alcohol use: Yes     Comment: socially    Drug use: Yes     Types: Marijuana     Review of Systems   Constitutional:  Negative for fever.   HENT:  Positive for dental problem.    Respiratory:  Negative for shortness of breath.    Genitourinary:  Negative for flank pain.   Musculoskeletal:  Negative for gait problem.   Neurological:  Negative for weakness.   Psychiatric/Behavioral:  Negative for confusion.    All other systems reviewed and are negative.    Physical Exam     Initial Vitals [06/01/23 1305]   BP Pulse Resp Temp SpO2   122/73 93 20 97.7 °F (36.5 °C) 100 %      MAP       --         Physical Exam    Nursing note and vitals reviewed.  Constitutional: Vital signs are normal. He appears well-developed and well-nourished.   HENT:   Head: Normocephalic and atraumatic.   Mouth/Throat: Uvula is midline and oropharynx is clear and moist.   Poor dentition noted on  exam.  There is no induration, mucus membrane changes, or buccal changes on exam.    Eyes: Pupils are equal, round, and reactive to light.   Neck: Neck supple.   Cardiovascular:  Normal rate, regular rhythm, normal heart sounds and intact distal pulses.     Exam reveals no gallop and no friction rub.       No murmur heard.  Pulmonary/Chest: Breath sounds normal. He has no wheezes. He has no rhonchi. He has no rales.   Abdominal: Abdomen is soft. Bowel sounds are normal. There is no abdominal tenderness.   Musculoskeletal:      Cervical back: Neck supple.     Neurological: He is alert and oriented to person, place, and time. He has normal strength.   Skin: Skin is warm, dry and intact.   Psychiatric: He has a normal mood and affect. His speech is normal and behavior is normal.       ED Course   Procedures  Labs Reviewed - No data to display       Imaging Results    None          Medications - No data to display  Medical Decision Making:   History:   Old Medical Records: I decided to obtain old medical records.  Differential Diagnosis:   Pulpitis  Dental abscess  Sinusitis      APC / Resident Notes:   Patient is a 33 y.o. male who presents to the ED 06/01/2023 who underwent emergent evaluation for dental pain. The patient appears to have pulpitis.  Based upon the history and physical I see no signs of Gino's angina, sublingual swelling, facial swelling, airway compromise, facial cellulitis, sepsis, dehydration, or a fluctuant abscess to drain.  I believe the patient can be discharged home with antibiotics and recommend antiinflammatories for home. The patient has been given specific return precautions and instructed to follow up with a dentist.            Scribe Attestation:   Scribe #1: I performed the above scribed service and the documentation accurately describes the services I performed. I attest to the accuracy of the note.    Attending Attestation:           Physician Attestation for Scribe:  Physician  Attestation Statement for Scribe #1: I, Elly Hdez, reviewed documentation, as scribed by in my presence, and it is both accurate and complete.     Comments: I, ELLIS AvilesC, personally performed the services described in this documentation. All medical record entries made by the scribe were at my direction and in my presence.  I have reviewed the chart and agree that the record reflects my personal performance and is accurate and complete. MINDI Aviles.  3:19 PM 06/01/2023                     Clinical Impression:   Final diagnoses:  [K08.89] Pain, dental (Primary)        ED Disposition Condition    Discharge Stable          ED Prescriptions       Medication Sig Dispense Start Date End Date Auth. Provider    penicillin v potassium (VEETID) 500 MG tablet Take 1 tablet (500 mg total) by mouth 4 (four) times daily. for 7 days 28 tablet 6/1/2023 6/8/2023 Elly Hdez NP          Follow-up Information       Follow up With Specialties Details Why Contact [facility] Dental  In 2 days  [facility] - Emergency Dept Emergency Medicine  As needed, If symptoms worsen [address] [place] [id number] [phone]             Elly Hdez NP  06/01/23 6803

## 2023-06-01 NOTE — FIRST PROVIDER EVALUATION
Emergency Department TeleTriage Encounter Note      CHIEF COMPLAINT    Chief Complaint   Patient presents with    Dental Pain     Dental abscess upper left side        VITAL SIGNS   Initial Vitals [06/01/23 1305]   BP Pulse Resp Temp SpO2   122/73 93 20 97.7 °F (36.5 °C) 100 %      MAP       --            ALLERGIES    Review of patient's allergies indicates:   Allergen Reactions    Demerol [meperidine] Rash       PROVIDER TRIAGE NOTE  Patient presents with dental abscess to the left side. No fever.       ORDERS  Labs Reviewed - No data to display    ED Orders (720h ago, onward)      None              Virtual Visit Note: The provider triage portion of this emergency department evaluation and documentation was performed via [employer], a HIPAA-compliant telemedicine application, in concert with a tele-presenter in the room. A face to face patient evaluation with one of my colleagues will occur once the patient is placed in an emergency department room.      DISCLAIMER: This note was prepared with [employer] voice recognition transcription software. Garbled syntax, mangled pronouns, and other bizarre constructions may be attributed to that software system.

## 2023-06-22 ENCOUNTER — TELEPHONE (OUTPATIENT)
Dept: OPHTHALMOLOGY | Facility: CLINIC | Age: 33
End: 2023-06-22
Payer: MEDICAID

## 2023-06-22 NOTE — TELEPHONE ENCOUNTER
Spoke to pt and made aware we are not in network for insurance, after making pt aware he hung up before being able to provide him a list of providers in the area.            ----- Message from Ivan Stein sent at 6/22/2023 10:08 AM CDT -----  Contact: [phone]  Pt is calling to schedule a Routine appt for glasses. Nothing is coming up on the schedule. Please call back to further assist.

## 2023-10-15 ENCOUNTER — HOSPITAL ENCOUNTER (EMERGENCY)
Facility: HOSPITAL | Age: 33
Discharge: HOME | End: 2023-10-15
Attending: EMERGENCY MEDICINE
Payer: MEDICAID

## 2023-10-15 VITALS
SYSTOLIC BLOOD PRESSURE: 127 MMHG | HEART RATE: 84 BPM | BODY MASS INDEX: 24.44 KG/M2 | TEMPERATURE: 98 F | HEIGHT: 69 IN | WEIGHT: 165 LBS | OXYGEN SATURATION: 97 % | DIASTOLIC BLOOD PRESSURE: 79 MMHG | RESPIRATION RATE: 16 BRPM

## 2023-10-15 DIAGNOSIS — K04.7 DENTAL ABSCESS: Primary | ICD-10-CM

## 2023-10-15 DIAGNOSIS — L03.211 FACIAL CELLULITIS: ICD-10-CM

## 2023-10-15 PROCEDURE — 25000003 PHARM REV CODE 250: Performed by: EMERGENCY MEDICINE

## 2023-10-15 PROCEDURE — 99283 EMERGENCY DEPT VISIT LOW MDM: CPT

## 2023-10-15 RX ORDER — CLINDAMYCIN HYDROCHLORIDE 150 MG/1
450 CAPSULE ORAL EVERY 8 HOURS
Qty: 45 CAPSULE | Refills: 0 | Status: SHIPPED | OUTPATIENT
Start: 2023-10-15 | End: 2023-10-20

## 2023-10-15 RX ORDER — CLINDAMYCIN HYDROCHLORIDE 150 MG/1
450 CAPSULE ORAL
Status: COMPLETED | OUTPATIENT
Start: 2023-10-15 | End: 2023-10-15

## 2023-10-15 RX ADMIN — CLINDAMYCIN HYDROCHLORIDE 450 MG: 150 CAPSULE ORAL at 04:10

## 2023-10-15 NOTE — ED PROVIDER NOTES
Encounter Date: 10/15/2023       History     Chief Complaint   Patient presents with    Oral Swelling     Right sided started today     Patient is a 33-year-old male who presents the emergency room for evaluation of right upper dental pain with mild facial swelling started yesterday.  He took 1 amoxicillin.  He is a cigarette smoker.  He also smokes marijuana.  He has poor dentition and has not followed up with a dentist.  No signs of difficulty breathing difficulty swallowing vomiting headache weakness numbness vision loss.      Review of patient's allergies indicates:   Allergen Reactions    Demerol [meperidine] Rash     Past Medical History:   Diagnosis Date    Chronic shoulder pain     Drug use      No past surgical history on file.  No family history on file.  Social History     Tobacco Use    Smoking status: Every Day     Current packs/day: 1.50     Types: Cigarettes    Smokeless tobacco: Never   Substance Use Topics    Alcohol use: Yes     Comment: socially    Drug use: Yes     Types: Marijuana     Review of Systems   Constitutional:  Negative for fatigue and fever.   HENT:  Positive for dental problem and facial swelling. Negative for ear pain, rhinorrhea and sore throat.    Eyes:  Negative for pain and visual disturbance.   Respiratory:  Negative for cough and shortness of breath.    Cardiovascular:  Negative for chest pain.   Gastrointestinal:  Negative for abdominal pain, diarrhea, nausea and vomiting.   Genitourinary:  Negative for difficulty urinating.   Musculoskeletal:  Negative for arthralgias.   Skin:  Negative for rash.   Neurological:  Negative for weakness, light-headedness, numbness and headaches.   All other systems reviewed and are negative.      Physical Exam     Initial Vitals [10/15/23 1601]   BP Pulse Resp Temp SpO2   (!) 145/84 106 18 97.9 °F (36.6 °C) 97 %      MAP       --         Physical Exam    Nursing note and vitals reviewed.  Constitutional: He appears well-developed and  well-nourished. No distress.   HENT:   Head: Normocephalic and atraumatic.   Patient has a dental abscess in the right upper incisor near tooth 6.  No drainable fluctuant abscess.  Mild tenderness over the nasal labial fold with erythema and mild swelling.  No trismus hoarseness or stridor   Eyes: Conjunctivae and EOM are normal. Pupils are equal, round, and reactive to light.   Neck: Neck supple.   Normal range of motion.  Cardiovascular:  Normal rate, regular rhythm, normal heart sounds and intact distal pulses.           Pulmonary/Chest: Breath sounds normal. He has no wheezes. He has no rhonchi. He has no rales.   Abdominal: Abdomen is soft. Bowel sounds are normal. There is no abdominal tenderness.   Musculoskeletal:         General: Normal range of motion.      Cervical back: Normal range of motion and neck supple.     Neurological: He is alert and oriented to person, place, and time. He has normal strength. No sensory deficit.   Skin: Skin is warm and dry.   Psychiatric: He has a normal mood and affect.         ED Course   Procedures  Labs Reviewed - No data to display       Imaging Results    None          Medications   clindamycin capsule 450 mg (has no administration in time range)     Medical Decision Making  Patient has a dental abscess with facial cellulitis.  No trismus hoarseness stridor.  No signs of airway compromise.  I do not think he needs to be admitted at this time.  Will start clindamycin.    Risk  Prescription drug management.                               Clinical Impression:   Final diagnoses:  [K04.7] Dental abscess (Primary)  [L03.211] Facial cellulitis        ED Disposition Condition    Discharge Stable          ED Prescriptions       Medication Sig Dispense Start Date End Date Auth. Provider    clindamycin (CLEOCIN) 150 MG capsule Take 3 capsules (450 mg total) by mouth every 8 (eight) hours. for 5 days 45 capsule 10/15/2023 10/20/2023 Yuri Carlton MD          Follow-up Information        Follow up With Specialties Details Why Contact Info        You need to schedule follow-up appoint with your dentist.  Call tomorrow to schedule             Yuri Carlton MD  10/15/23 5727       Yuri Carlton MD  10/15/23 2200

## 2023-10-18 ENCOUNTER — HOSPITAL ENCOUNTER (EMERGENCY)
Facility: HOSPITAL | Age: 33
Discharge: LEFT BEFORE DISCHARGE | End: 2023-10-18
Attending: STUDENT IN AN ORGANIZED HEALTH CARE EDUCATION/TRAINING PROGRAM
Payer: MEDICAID

## 2023-10-18 VITALS
DIASTOLIC BLOOD PRESSURE: 90 MMHG | RESPIRATION RATE: 18 BRPM | TEMPERATURE: 98 F | SYSTOLIC BLOOD PRESSURE: 142 MMHG | OXYGEN SATURATION: 97 % | BODY MASS INDEX: 24.37 KG/M2 | HEART RATE: 93 BPM | WEIGHT: 165 LBS

## 2023-10-18 DIAGNOSIS — N20.0 KIDNEY STONE: Primary | ICD-10-CM

## 2023-10-18 DIAGNOSIS — R31.9 HEMATURIA, UNSPECIFIED TYPE: ICD-10-CM

## 2023-10-18 DIAGNOSIS — R10.9 ACUTE RIGHT FLANK PAIN: ICD-10-CM

## 2023-10-18 LAB
BACTERIA #/AREA URNS HPF: ABNORMAL /HPF
BILIRUB UR QL STRIP: NEGATIVE
CLARITY UR: ABNORMAL
COLOR UR: ABNORMAL
GLUCOSE UR QL STRIP: NEGATIVE
HGB UR QL STRIP: ABNORMAL
HYALINE CASTS #/AREA URNS LPF: 0 /LPF
KETONES UR QL STRIP: NEGATIVE
LEUKOCYTE ESTERASE UR QL STRIP: ABNORMAL
MICROSCOPIC COMMENT: ABNORMAL
NITRITE UR QL STRIP: NEGATIVE
PH UR STRIP: 6 [PH] (ref 5–8)
PROT UR QL STRIP: ABNORMAL
RBC #/AREA URNS HPF: >100 /HPF (ref 0–4)
SP GR UR STRIP: 1.02 (ref 1–1.03)
URN SPEC COLLECT METH UR: ABNORMAL
UROBILINOGEN UR STRIP-ACNC: NEGATIVE EU/DL
WBC #/AREA URNS HPF: 1 /HPF (ref 0–5)

## 2023-10-18 PROCEDURE — 81000 URINALYSIS NONAUTO W/SCOPE: CPT | Performed by: STUDENT IN AN ORGANIZED HEALTH CARE EDUCATION/TRAINING PROGRAM

## 2023-10-18 PROCEDURE — 99281 EMR DPT VST MAYX REQ PHY/QHP: CPT | Mod: 25

## 2023-10-18 RX ORDER — ONDANSETRON 4 MG/1
4 TABLET, ORALLY DISINTEGRATING ORAL EVERY 8 HOURS PRN
Qty: 9 TABLET | Refills: 0 | Status: SHIPPED | OUTPATIENT
Start: 2023-10-18 | End: 2023-10-27

## 2023-10-18 NOTE — LETTER
Patient: Darvin Forman  YOB: 1990  Date: 10/18/2023 Time: 11:37 PM  Location: [facility]    Leaving the Hospital Against Medical Advice    Chart #:[id number]    This will certify that I, the undersigned,    ______________________________________________________________________    A patient in the above named medical center, having requested discharge and removal from the medical center against the advice of my attending physician(s), hereby release [facility], its physicians, officers and employees, severally and individually, from any and all liability of any nature whatsoever for any injury or harm or complication of any kind that may result directly or indirectly, by reason of my terminating my stay as a patient at [facility] and my departure from [facility], and hereby waive any and all rights of action I may now have or later acquire as a result of my voluntary departure from [facility] and the termination of my stay as a patient therein.    This release is made with the full knowledge of the danger that may result from the action which I am taking.      Date:_______________________                         ___________________________                                                                                    Patient/Legal Representative    Witness:        ____________________________                          ___________________________  Nurse                                                                        Physician

## 2023-10-19 NOTE — ED PROVIDER NOTES
Encounter Date: 10/18/2023       History     Chief Complaint   Patient presents with    Hematuria     Peeing blood since this morning. Right flank pain     33-year-old male presents with right flank pain and hematuria.  Moderate to severe severity.  Onset within the last 24 hours.  No anticoagulation use, no syncopal symptoms, no pulsatile bleeding    The history is provided by the patient and a significant other.     Review of patient's allergies indicates:   Allergen Reactions    Demerol [meperidine] Rash     Past Medical History:   Diagnosis Date    Chronic shoulder pain     Drug use      No past surgical history on file.  No family history on file.  Social History     Tobacco Use    Smoking status: Every Day     Current packs/day: 1.50     Types: Cigarettes    Smokeless tobacco: Never   Substance Use Topics    Alcohol use: Yes     Comment: socially    Drug use: Yes     Types: Marijuana     Review of Systems   All other systems reviewed and are negative.      Physical Exam     Initial Vitals [10/18/23 2100]   BP Pulse Resp Temp SpO2   139/87 104 18 98 °F (36.7 °C) 100 %      MAP       --         Physical Exam    Nursing note and vitals reviewed.  Constitutional: Vital signs are normal. He appears well-developed and well-nourished.  Non-toxic appearance. No distress.   HENT:   Head: Normocephalic and atraumatic.   Eyes: EOM are normal. Right eye exhibits no discharge. Left eye exhibits no discharge.   Neck:   Normal range of motion.  Cardiovascular:  Regular rhythm.           Rate 104   Pulmonary/Chest: No stridor. No respiratory distress.   Bilateral chest rise   Abdominal: Abdomen is soft. There is no abdominal tenderness.   Musculoskeletal:         General: No tenderness or edema.      Cervical back: Normal range of motion. No rigidity.     Neurological: He is alert.   No associated focal motor or sensory deficit   Skin: Skin is warm and dry. No rash noted.   Psychiatric: His speech is normal. He is not  actively hallucinating.   Patient appears frustrated, somewhat angry         ED Course   Procedures  Labs Reviewed   URINALYSIS, REFLEX TO URINE CULTURE - Abnormal; Notable for the following components:       Result Value    Color, UA Orange (*)     Appearance, UA Hazy (*)     Protein, UA 2+ (*)     Occult Blood UA 3+ (*)     Leukocytes, UA Trace (*)     All other components within normal limits    Narrative:     Specimen Source->Urine   URINALYSIS MICROSCOPIC - Abnormal; Notable for the following components:    RBC, UA >100 (*)     All other components within normal limits    Narrative:     Specimen Source->Urine          Imaging Results               CT Renal Stone Study ABD Pelvis WO (Final result)  Result time 10/18/23 23:22:19      Final result by Karen Faith MD (10/18/23 23:22:19)                   Impression:      2 mm obstructing calculus in the proximal right ureter with mild-to-moderate hydronephrosis.  No other calculi and left kidney is absent.    Moderate proximal colon stool burden constipation as described.    No other acute or significant focal abdominal or pelvic abnormality.    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    5 mm incidental nodule in the lingula.  Correlate with risk factors For a solid nodule <6 mm, Fleischner Society 2017 guidelines recommend no routine follow up for a low risk patient, or follow-up with non-contrast chest CT at 12 months in a high risk patient.    This report was flagged in Epic as abnormal.      Electronically signed by: Karen Faith  Date:    10/18/2023  Time:    23:22               Narrative:    EXAMINATION:  CT RENAL STONE STUDY ABD PELVIS WO    CLINICAL HISTORY:  Flank pain, kidney stone suspected;    TECHNIQUE:  Low dose axial images, sagittal and coronal reformations were obtained from the lung bases to the pubic symphysis.    COMPARISON:  None    FINDINGS:  Abdomen:    - Lung bases: There is a 5 mm pulmonary nodule in the  lingula axial image 21 series 2.  Lung bases are otherwise clear.  There is no pleural effusion or cardiomegaly.    The lack of IV contrast limits evaluation of solid organs for focal lesions.    - Liver: Liver appears homogeneous and nonenlarged.    - Gallbladder: There is no evidence of cholelithiasis or cholecystitis.    - Bile Ducts: No evidence of intra or extra hepatic biliary ductal dilation.    - Spleen: Negative.    - Kidneys: There is a 2 mm mild mildly obstructing calculus in the proximal right ureter around the level of L3-L4 disc axial image 89 series 2.  Mild-to-moderate hydronephrosis and proximal ureterectasis noted.  No other calculi are seen in the urinary tract.  There is an absent left kidney.    - Adrenals: Unremarkable.    - Pancreas: Pancreas is unremarkable as imaged.  There is no peripancreatic inflammation.    - Retroperitoneum:  No significant adenopathy.    - Vascular: No abdominal aortic aneurysm or significant atherosclerosis.    - Abdominal wall:  Very tiny fat containing umbilical hernia is seen.    Pelvis:    No pelvic mass, adenopathy, or free fluid.  There is incidental tethering of the dome of the bladder to the umbilicus with urachal remnant/urachal diverticulum noted.  Bladder is otherwise unremarkable.  Prostate is not significantly enlarged.    Bowel/Mesentery:    No evidence of bowel obstruction or inflammation.  Appendix is normal.  There is moderate stool burden constipation throughout the ascending and transverse colon .  No distal fecal impaction or significant distal descending and sigmoid colon constipation.  There is no mesenteric adenopathy.    Bones:  No acute osseous abnormality and no suspicious lytic or blastic lesion. Mild spondylosis of the lumbar spine.                                       Medications - No data to display  Medical Decision Making  Patient refused blood work.  Unable to evaluate for any acute kidney injury or any hemoglobin status.  Patient is  tachycardic on exam, euvolemic feel this may be due to his anger/frustration.  Patient does not appear to be in any any acute distress at this time.  No significant suspicion for AAA.  Patient agreeable to CT scan to rule out any obstructive uropathy as well as urinalysis for infection.      -urinalysis no evidence of infection, CT imaging confirms right-sided 2 mm obstructive stone proximal ureter.  Patient updated once again declined renal function evaluation.  Patient will sign out against medical advice.  Has capacity to make decision understands risks and benefits include infection, kidney failure and death.  Ambulatory referral placed for urology follow up as well as sent home with nausea meds.  I did not send patient home with pain meds added do not want to mask any kidney injury/failure.    Amount and/or Complexity of Data Reviewed  Independent Historian:      Details: Significant other  Labs:  Decision-making details documented in ED Course.  Radiology: ordered.    Risk  Prescription drug management.               ED Course as of 10/18/23 2333   Wed Oct 18, 2023   2124 Patient refused IV access and blood work.  Patient has capacity to make his decision understands risks and benefits. [KB]   2241 Leukocytes, UA(!): Trace [KB]   2241 Occult Blood UA(!): 3+ [KB]   2241 Appearance, UA(!): Hazy [KB]   2241 RBC, UA(!): >100 [KB]   2327 Patient updated findings, still does not want to evaluate kidney function.  We will provide referral for Urology and patient will signed out AMA, has capacity to make his decision, understand risks and benefits. [KB]      ED Course User Index  [KB] Solitario Garcia Jr., DO                    Clinical Impression:   Final diagnoses:  [R31.9] Hematuria, unspecified type  [R10.9] Acute right flank pain  [N20.0] Kidney stone - right sided (Primary)        ED Disposition Condition    AMA Stable                Solitario Garcia Jr., DO  10/18/23 2333       Solitario Garcia Jr.,  DO  10/18/23 2335

## 2024-05-13 ENCOUNTER — OFFICE VISIT (OUTPATIENT)
Dept: URGENT CARE | Facility: CLINIC | Age: 34
End: 2024-05-13
Payer: MEDICAID

## 2024-05-13 VITALS
WEIGHT: 175 LBS | TEMPERATURE: 98 F | HEART RATE: 92 BPM | OXYGEN SATURATION: 98 % | HEIGHT: 69 IN | SYSTOLIC BLOOD PRESSURE: 123 MMHG | RESPIRATION RATE: 16 BRPM | DIASTOLIC BLOOD PRESSURE: 78 MMHG | BODY MASS INDEX: 25.92 KG/M2

## 2024-05-13 DIAGNOSIS — L02.522: Primary | ICD-10-CM

## 2024-05-13 PROCEDURE — 99204 OFFICE O/P NEW MOD 45 MIN: CPT | Mod: S$GLB,,,

## 2024-05-13 RX ORDER — DOXYCYCLINE 100 MG/1
100 CAPSULE ORAL 2 TIMES DAILY
Qty: 14 CAPSULE | Refills: 0 | Status: SHIPPED | OUTPATIENT
Start: 2024-05-13 | End: 2024-05-20

## 2024-05-13 RX ORDER — BUPRENORPHINE HYDROCHLORIDE 8 MG/1
8 TABLET SUBLINGUAL DAILY PRN
COMMUNITY
Start: 2024-04-05

## 2024-05-13 RX ORDER — GABAPENTIN 400 MG/1
400 CAPSULE ORAL NIGHTLY
COMMUNITY
Start: 2024-04-18

## 2024-05-13 NOTE — PROGRESS NOTES
"Subjective:      Patient ID: Darvin Forman is a 34 y.o. male.    Vitals:  height is 5' 9" (1.753 m) and weight is 79.4 kg (175 lb). His temperature is 98.2 °F (36.8 °C). His blood pressure is 123/78 and his pulse is 92. His respiration is 16 and oxygen saturation is 98%.     Chief Complaint: Abscess    In clinic with a chief complaint of a left hand 4th proximal finger abscess.  Has been present for several days and gradually worsening.  He states while in the shower today he was able to express contents.  He was requesting antibiotics.  Patient is right-hand dominant.  Reports Tdap is up-to-date.  He denies attempting to drain using sharps at home.    There is an obvious drainable fluid collection on finger.  Attempted to express contents, and unable.  Discussed treatment options with incision and drainage, and culture.  Patient declined.  AMA signed by patient    Abscess  Chronicity:  NewProgression Since Onset: gradually worsening  Location:  Finger  Associated Symptoms: no fever  Characteristics: draining, painful, redness and swelling    Pain Scale:  2/10  Treatments Tried:  Topical antibiotics  Worsened by:  Draining/squeezing      Constitution: Negative for fever.   Skin:  Positive for abscess.   Allergic/Immunologic: Positive for immunizations up-to-date.      Objective:     Physical Exam   Constitutional: He is oriented to person, place, and time. He appears well-developed. He is cooperative.   HENT:   Head: Normocephalic and atraumatic.   Ears:   Right Ear: Hearing and tympanic membrane normal.   Left Ear: Hearing and tympanic membrane normal.   Nose: Nose normal. No mucosal edema or nasal deformity. No epistaxis. Right sinus exhibits no maxillary sinus tenderness and no frontal sinus tenderness. Left sinus exhibits no maxillary sinus tenderness and no frontal sinus tenderness.   Mouth/Throat: Uvula is midline, oropharynx is clear and moist and mucous membranes are normal. Mucous membranes are moist. No " trismus in the jaw. Normal dentition. No uvula swelling. Oropharynx is clear.   Eyes: Conjunctivae and lids are normal. Pupils are equal, round, and reactive to light. Extraocular movement intact   Neck: Trachea normal and phonation normal. Neck supple.   Cardiovascular: Normal rate, regular rhythm, normal heart sounds and normal pulses.   Pulmonary/Chest: Effort normal and breath sounds normal.   Abdominal: Normal appearance.   Musculoskeletal: Normal range of motion.         General: Normal range of motion.      Left hand: Left little finger: Injuries: blister (Furuncle).        Hands:    Neurological: no focal deficit. He is alert and oriented to person, place, and time. He exhibits normal muscle tone.   Skin: Skin is warm, dry and intact. Capillary refill takes 2 to 3 seconds.   Psychiatric: His speech is normal and behavior is normal. Mood, judgment and thought content normal.   Nursing note and vitals reviewed.      Assessment:     1. Furuncle of finger of left hand        Plan:       Furuncle of finger of left hand  -     doxycycline (VIBRAMYCIN) 100 MG Cap; Take 1 capsule (100 mg total) by mouth 2 (two) times daily. for 7 days  Dispense: 14 capsule; Refill: 0      Return to clinic for incision and drainage.      AMA signed

## 2025-02-14 ENCOUNTER — HOSPITAL ENCOUNTER (EMERGENCY)
Facility: HOSPITAL | Age: 35
Discharge: HOME | End: 2025-02-14
Attending: EMERGENCY MEDICINE
Payer: MEDICAID

## 2025-02-14 VITALS
HEIGHT: 69 IN | DIASTOLIC BLOOD PRESSURE: 80 MMHG | HEART RATE: 99 BPM | SYSTOLIC BLOOD PRESSURE: 148 MMHG | BODY MASS INDEX: 26.66 KG/M2 | OXYGEN SATURATION: 100 % | WEIGHT: 180 LBS | RESPIRATION RATE: 18 BRPM | TEMPERATURE: 98 F

## 2025-02-14 DIAGNOSIS — B34.9 VIRAL SYNDROME: Primary | ICD-10-CM

## 2025-02-14 PROCEDURE — 99281 EMR DPT VST MAYX REQ PHY/QHP: CPT

## 2025-02-14 NOTE — Clinical Note
"Darvin Esquivel"Dickson was seen and treated in our emergency department on 2/14/2025.  He may return to work on 02/17/2025.       If you have any questions or concerns, please don't hesitate to call.      Frieda Mazariegos, SCOUT"

## 2025-02-15 NOTE — ED PROVIDER NOTES
Encounter Date: 2/14/2025       History     Chief Complaint   Patient presents with    Fever     Pt had a fever 2 days ago. Started having diarrhea yesterday.      34-year-old male presents emergency department reports that he had fever 2 days ago, and a few episodes of loose stools.  Patient states that he did not go to work for 2 days, he states that he feels fine today, he has no symptoms, and came to the emergency department for a work note.  The patient does not wish to have any ER workup.    The history is provided by the patient.     Review of patient's allergies indicates:   Allergen Reactions    Demerol [meperidine] Rash     Past Medical History:   Diagnosis Date    Chronic shoulder pain     Drug use      History reviewed. No pertinent surgical history.  No family history on file.  Social History     Tobacco Use    Smoking status: Every Day     Current packs/day: 1.50     Types: Cigarettes    Smokeless tobacco: Never   Substance Use Topics    Alcohol use: Yes     Comment: socially    Drug use: Yes     Types: Marijuana     Review of Systems   Constitutional:  Negative for chills and fever.   HENT: Negative.     Respiratory: Negative.     Cardiovascular: Negative.    Genitourinary: Negative.    Musculoskeletal: Negative.    Neurological: Negative.    Hematological: Negative.    Psychiatric/Behavioral: Negative.         Physical Exam     Initial Vitals [02/14/25 1156]   BP Pulse Resp Temp SpO2   (!) 150/92 91 18 97.8 °F (36.6 °C) 100 %      MAP       --         Physical Exam    Nursing note and vitals reviewed.  Constitutional: He appears well-developed and well-nourished.   HENT:   Head: Normocephalic and atraumatic.   Right Ear: External ear normal.   Left Ear: External ear normal.   Nose: Nose normal. Mouth/Throat: No oropharyngeal exudate.   Eyes: Conjunctivae and EOM are normal. Pupils are equal, round, and reactive to light.   Neck: Neck supple.   Normal range of motion.  Cardiovascular:  Normal rate.            Pulmonary/Chest: Breath sounds normal.   Abdominal: Abdomen is soft. Bowel sounds are normal. He exhibits no distension. There is no abdominal tenderness.   Musculoskeletal:         General: Normal range of motion.      Cervical back: Normal range of motion and neck supple.     Neurological: He is alert and oriented to person, place, and time. He has normal strength.   Skin: Skin is warm and dry.   Psychiatric: He has a normal mood and affect. Thought content normal.         ED Course   Procedures  Labs Reviewed - No data to display       Imaging Results    None          Medications - No data to display  Medical Decision Making  34-year-old male presents emergency department reports that he had fever 2 days ago, and a few episodes of loose stools.  Patient states that he did not go to work for 2 days, he states that he feels fine today, he has no symptoms, and came to the emergency department for a work note.  The patient does not wish to have any ER workup.    The history is provided by the patient.     Considerations include viral illness, electrolyte abnormalities, bowel obstruction, colitis, appendicitis    34-year-old male presents emergency department requesting a sick note only, he states that his symptoms that he was having 2 days ago have completely resolved and he states he tried to go back to work today and they would not let him.  The patient states he came to the emergency department only for note.  The patient refused any labs, or CT imaging.  On exam the patient's abdomen is soft and nontender according to him, he is afebrile his vital signs are normal.  He has no current complaints.  He will be discharged home he was given return precautions I have instructed the patient that he could have other etiologies causing him to have fevers, and diarrhea that would only be noted on CT imaging patient understands, he verbalizes that he will return if he changes his mind regarding further evaluation as  offered.    Amount and/or Complexity of Data Reviewed  Labs:  Decision-making details documented in ED Course.  Radiology:  Decision-making details documented in ED Course.                                      Clinical Impression:  Final diagnoses:  [B34.9] Viral syndrome (Primary)          ED Disposition Condition    Discharge Stable          ED Prescriptions    None       Follow-up Information    None          Frieda Mazariegos FNP  02/14/25 [id number]

## 2025-08-12 ENCOUNTER — HOSPITAL ENCOUNTER (EMERGENCY)
Facility: HOSPITAL | Age: 35
Discharge: HOME | End: 2025-08-12
Attending: STUDENT IN AN ORGANIZED HEALTH CARE EDUCATION/TRAINING PROGRAM
Payer: MEDICAID

## 2025-08-12 VITALS
BODY MASS INDEX: 26.66 KG/M2 | RESPIRATION RATE: 19 BRPM | SYSTOLIC BLOOD PRESSURE: 131 MMHG | TEMPERATURE: 98 F | DIASTOLIC BLOOD PRESSURE: 91 MMHG | HEIGHT: 69 IN | OXYGEN SATURATION: 95 % | WEIGHT: 180 LBS | HEART RATE: 72 BPM

## 2025-08-12 DIAGNOSIS — S46.011A STRAIN OF RIGHT ROTATOR CUFF CAPSULE, INITIAL ENCOUNTER: ICD-10-CM

## 2025-08-12 DIAGNOSIS — M25.511 ACUTE PAIN OF RIGHT SHOULDER: Primary | ICD-10-CM

## 2025-08-12 PROCEDURE — 99281 EMR DPT VST MAYX REQ PHY/QHP: CPT

## 2025-08-13 DIAGNOSIS — M25.511 RIGHT SHOULDER PAIN, UNSPECIFIED CHRONICITY: Primary | ICD-10-CM

## 2025-08-15 ENCOUNTER — HOSPITAL ENCOUNTER (OUTPATIENT)
Dept: RADIOLOGY | Facility: HOSPITAL | Age: 35
Discharge: HOME | End: 2025-08-15
Attending: ORTHOPAEDIC SURGERY
Payer: MEDICAID

## 2025-08-15 ENCOUNTER — OFFICE VISIT (OUTPATIENT)
Dept: ORTHOPEDICS | Facility: CLINIC | Age: 35
End: 2025-08-15
Payer: MEDICAID

## 2025-08-15 VITALS — WEIGHT: 180 LBS | BODY MASS INDEX: 26.66 KG/M2 | HEIGHT: 69 IN

## 2025-08-15 DIAGNOSIS — M25.511 ACUTE PAIN OF RIGHT SHOULDER: ICD-10-CM

## 2025-08-15 DIAGNOSIS — S46.011A STRAIN OF RIGHT ROTATOR CUFF CAPSULE, INITIAL ENCOUNTER: ICD-10-CM

## 2025-08-15 DIAGNOSIS — M25.511 RIGHT SHOULDER PAIN, UNSPECIFIED CHRONICITY: ICD-10-CM

## 2025-08-15 PROCEDURE — 73030 X-RAY EXAM OF SHOULDER: CPT | Mod: TC,PO,RT

## 2025-08-15 PROCEDURE — 73030 X-RAY EXAM OF SHOULDER: CPT | Mod: 26,RT,, | Performed by: RADIOLOGY

## 2025-08-15 PROCEDURE — 99999 PR PBB SHADOW E&M-EST. PATIENT-LVL III: CPT | Mod: PBBFAC,,, | Performed by: ORTHOPAEDIC SURGERY

## 2025-08-15 PROCEDURE — 99213 OFFICE O/P EST LOW 20 MIN: CPT | Mod: PBBFAC,25,PO | Performed by: ORTHOPAEDIC SURGERY
